# Patient Record
Sex: MALE | Race: WHITE | NOT HISPANIC OR LATINO | URBAN - METROPOLITAN AREA
[De-identification: names, ages, dates, MRNs, and addresses within clinical notes are randomized per-mention and may not be internally consistent; named-entity substitution may affect disease eponyms.]

---

## 2020-05-12 ENCOUNTER — TELEPHONE (OUTPATIENT)
Dept: NEUROLOGY | Facility: CLINIC | Age: 40
End: 2020-05-12

## 2020-05-12 NOTE — TELEPHONE ENCOUNTER
----- Message from Kiki Flores sent at 5/12/2020  1:23 PM CDT -----  Contact: [employer]  Type:  Needs Medical Advice    Who Called: Carmita from [employer]  Symptoms (please be specific): headaches, jar pain and severe head pain  How long has patient had these symptoms:  6 months last 2 weeks really worst  Would the patient rather a call back or a response via [facility]? Callback  Best Call Back Number: [phone]  Additional Information: Pt has [employer] Premium and was informed that coverage is not within the participating payor and still would like to get an appt scheduled

## 2020-05-12 NOTE — TELEPHONE ENCOUNTER
Spoke with patients wife and informed her that he is scheduled to see Tika 5/13/2020 @ 9:40. Vraun is to wear a mask, temp will be checked upon entry and no visitors allowed at this time.

## 2020-05-12 NOTE — TELEPHONE ENCOUNTER
----- Message from Andres Omer sent at 5/12/2020  1:31 PM CDT -----  [facility] from the office in regards to getting an appointment the pt is suffering with headaches    Contact info

## 2020-05-13 ENCOUNTER — TELEPHONE (OUTPATIENT)
Dept: NEUROLOGY | Facility: CLINIC | Age: 40
End: 2020-05-13

## 2020-05-13 ENCOUNTER — OFFICE VISIT (OUTPATIENT)
Dept: NEUROLOGY | Facility: CLINIC | Age: 40
End: 2020-05-13
Payer: COMMERCIAL

## 2020-05-13 VITALS
HEIGHT: 68 IN | WEIGHT: 131.38 LBS | TEMPERATURE: 98 F | HEART RATE: 87 BPM | BODY MASS INDEX: 19.91 KG/M2 | DIASTOLIC BLOOD PRESSURE: 91 MMHG | SYSTOLIC BLOOD PRESSURE: 121 MMHG

## 2020-05-13 DIAGNOSIS — G50.0 TRIGEMINAL NEURALGIA OF LEFT SIDE OF FACE: ICD-10-CM

## 2020-05-13 DIAGNOSIS — G50.0 TRIGEMINAL NEURALGIA OF LEFT SIDE OF FACE: Primary | ICD-10-CM

## 2020-05-13 DIAGNOSIS — M79.2 NEURALGIA: Primary | ICD-10-CM

## 2020-05-13 DIAGNOSIS — G50.9 DISORDER OF TRIGEMINAL NERVE: ICD-10-CM

## 2020-05-13 PROCEDURE — 99999 PR PBB SHADOW E&M-EST. PATIENT-LVL III: CPT | Mod: PBBFAC,,, | Performed by: NEUROMUSCULOSKELETAL MEDICINE & OMM

## 2020-05-13 PROCEDURE — 99999 PR PBB SHADOW E&M-EST. PATIENT-LVL III: ICD-10-PCS | Mod: PBBFAC,,, | Performed by: NEUROMUSCULOSKELETAL MEDICINE & OMM

## 2020-05-13 PROCEDURE — 99204 PR OFFICE/OUTPT VISIT, NEW, LEVL IV, 45-59 MIN: ICD-10-PCS | Mod: S$GLB,,, | Performed by: NEUROMUSCULOSKELETAL MEDICINE & OMM

## 2020-05-13 PROCEDURE — 99204 OFFICE O/P NEW MOD 45 MIN: CPT | Mod: S$GLB,,, | Performed by: NEUROMUSCULOSKELETAL MEDICINE & OMM

## 2020-05-13 RX ORDER — OXYCODONE AND ACETAMINOPHEN 10; 325 MG/1; MG/1
TABLET ORAL
COMMUNITY
Start: 2020-05-11

## 2020-05-13 RX ORDER — HYDROCODONE BITARTRATE AND ACETAMINOPHEN 7.5; 325 MG/1; MG/1
TABLET ORAL
COMMUNITY
Start: 2020-04-22 | End: 2020-05-13 | Stop reason: SDUPTHER

## 2020-05-13 RX ORDER — GABAPENTIN 400 MG/1
400 CAPSULE ORAL
Qty: 150 CAPSULE | Refills: 3 | Status: SHIPPED | OUTPATIENT
Start: 2020-05-13

## 2020-05-13 RX ORDER — ETODOLAC 400 MG/1
TABLET, FILM COATED ORAL
COMMUNITY
Start: 2020-04-24

## 2020-05-13 RX ORDER — HYDROCODONE BITARTRATE AND ACETAMINOPHEN 7.5; 325 MG/1; MG/1
TABLET ORAL
Qty: 30 TABLET | Refills: 0 | Status: SHIPPED | OUTPATIENT
Start: 2020-05-13

## 2020-05-13 RX ORDER — GABAPENTIN 300 MG/1
400 CAPSULE ORAL
COMMUNITY
Start: 2020-05-07 | End: 2020-05-13 | Stop reason: SDUPTHER

## 2020-05-13 NOTE — TELEPHONE ENCOUNTER
----- Message from Marisela Ahuja sent at 5/13/2020  2:40 PM CDT -----  Contact: mom  Pt is asking can a referral be sent to another MRI place she states that pt insurance wont cover it and its way to expensive she found a more reasonable place which is [facility]     Located at [address] [place] [address] [phone]

## 2020-05-13 NOTE — PROGRESS NOTES
This note was dictated with Modal Fluency a word recognition program. There are occasionally word recognition errors which are missed on review.  Moo Burton  1980  Review of patient's allergies indicates:  No Known Allergies  [unfilled]    No past medical history on file.  Social History     Socioeconomic History    Marital status:      Spouse name: Not on file    Number of children: Not on file    Years of education: Not on file    Highest education level: Not on file   Occupational History    Not on file   Social Needs    Financial resource strain: Not on file    Food insecurity:     Worry: Not on file     Inability: Not on file    Transportation needs:     Medical: Not on file     Non-medical: Not on file   Tobacco Use    Smoking status: Current Every Day Smoker   Substance and Sexual Activity    Alcohol use: Yes    Drug use: Not on file    Sexual activity: Not on file   Lifestyle    Physical activity:     Days per week: Not on file     Minutes per session: Not on file    Stress: Not on file   Relationships    Social connections:     Talks on phone: Not on file     Gets together: Not on file     Attends Zoroastrianism service: Not on file     Active member of club or organization: Not on file     Attends meetings of clubs or organizations: Not on file     Relationship status: Not on file   Other Topics Concern    Not on file   Social History Narrative    Not on file     No family history on file.    Review of systems:  Constitutional-negative  Eyes-negative  ENT, mouth-negative  Cardiovascular-negative  Respiratory-negative  GI-negative  - negative  Musculoskeletal-negative  Skin-negative  Neurologic-negative  Psychiatric-negative  Endocrine-negative  Hematology/lymph nodes-negative  Allergies/immunology-negative  Moo Burton  1980  Review of patient's allergies indicates:  No Known Allergies  [unfilled]    No past medical history on file.  Social History      Socioeconomic History    Marital status:      Spouse name: Not on file    Number of children: Not on file    Years of education: Not on file    Highest education level: Not on file   Occupational History    Not on file   Social Needs    Financial resource strain: Not on file    Food insecurity:     Worry: Not on file     Inability: Not on file    Transportation needs:     Medical: Not on file     Non-medical: Not on file   Tobacco Use    Smoking status: Current Every Day Smoker   Substance and Sexual Activity    Alcohol use: Yes    Drug use: Not on file    Sexual activity: Not on file   Lifestyle    Physical activity:     Days per week: Not on file     Minutes per session: Not on file    Stress: Not on file   Relationships    Social connections:     Talks on phone: Not on file     Gets together: Not on file     Attends [employer] service: Not on file     Active member of club or organization: Not on file     Attends meetings of clubs or organizations: Not on file     Relationship status: Not on file   Other Topics Concern    Not on file   Social History Narrative    Not on file     No family history on file.    Review of systems:  Constitutional-negative  Eyes-negative  ENT, mouth-negative  Cardiovascular-negative  Respiratory-negative  GI-negative  - negative  Musculoskeletal-negative  Skin-negative  Neurologic-negative  Psychiatric-negative  Endocrine-negative  Hematology/lymph nodes-negative  Allergies/immunology-negative  Gen. Appearance: Well-developed with no obvious deformities  Carotid arteries symmetrical pulses  Peripheral vascular shows symmetrical pulses with no obvious edema or tenderness  Social History :  Patient works in [employer].  Present history:   This is a 39-year-old white male who presents with a history of severe left-sided jaw pain described as intermittent shocking sensation lasting seconds to minutes on the left side.  Pain is triggered by chewing and  patient has had difficulty eating with a loss of 15 lb.  He denies any specific temporomandibular joint pain.  Pain occasionally goes into the left ear or face.  Pain started in November with the thought that this was a dental problem in patients all several dentists over 8 visits with no diagnosis however was told to see a neurologist.  Pain is intermittent with yesterday no pain for most of the day.  Brushing teeth definitely aggravates the pain.  He saw ENT with a clear workup.  Patient was started on gabapentin 100 mg 3 times a day and increase to 300 mg 3 times a day.  The the gabapentin initially helped but is no longer been helping.  Patient has had nerve brain testing.    Neurological Exam:  Mental status-alert and oriented to person, place, and time; attention span and concentration is good. Fund of knowledge-patient is aware of current events and able to give detailed history of the current problem.recent and remote memory seems intact. Language function is normal with no evidence of aphasia  Cranial nerves:Visual acuity to hand chart -normal; visual fields to confrontation normal;pupils were equal and reactive to light ;no evidence of ptosis ;  funduscopic examination was normal with sharp disc margins. external ocular movements were full with no nystagmus. Facial sensation to pinprick : normal ; corneal reflexes intact; Facial muscles were symmetrical. Hearing is unimpaired symmetrical finger rub; Tongue movements - normal ; palate movements - normal ;Swallowing unimpaired. Shoulder shrug was intact with good strength Speech was normal  Motor examination: Upper : normal                                      Lower extremities - Normal;muscle tone was normal ;                  Right-handed  Sensory examination:   Upper; normal pinprick and soft touch ;   Lower extremities - normal and symmetrical.   Vibration sense: 15-20 seconds @ toes  Deep tendon reflexes: upper extremities :1-2+ symmetrical ;     lower  extremities KJ- 1-2 +; AJ - 1-2+ Both plantar responses were flexor  Cerebellar examination upper: Normal finger to nose and rapid alternating movements  Gait: Steady with no ataxia;      heel and toe walk normal  Romberg test: negative       Tandem gait: Normal    Involuntary movements: Negative  TMJ - no tenderness  Cervical examination: Full range of motion with no pain Cervical tenderness :negative  Lumbar examination: Low back tenderness-negative                  Sciatic notchtenderness-negative            Straight leg raising : negative    Impression:  Probable left trigeminal neuralgia.    Recommendations/Plan :  Long discussion with the patient terms of differential diagnosis of the causes.  Will order MRI brain to rule out neoplasm versus demyelinating disease.  Long discussion in terms of the medications of gabapentin.  Will increase pills to 400 mg to take 3-4 pills per day.  Long discussion with the patient in terms of the pharmacology of the medicine relative the long-term risks of short-term.  Patient is encouraged to take the medicine more as needed as opposed to regular dose basis.  He has also worn on the weather fronts causing aggravation of the symptoms.  Will follow up in 1 month after MRI brain.

## 2020-05-15 ENCOUNTER — TELEPHONE (OUTPATIENT)
Dept: NEUROLOGY | Facility: CLINIC | Age: 40
End: 2020-05-15

## 2020-05-15 NOTE — TELEPHONE ENCOUNTER
----- Message from Kary Hollingsworth sent at 5/15/2020 10:18 AM CDT -----  Contact: pts wife Heather   pts wife is calling to speak with the nurse to get an email address to send the form to they don't have a fax machine. Can you please call pts wife at [phone].    TAL

## 2020-05-15 NOTE — TELEPHONE ENCOUNTER
----- Message from Brodie Morrison sent at 5/14/2020  2:36 PM CDT -----  Contact: Mrs. Burton (wife) @ [phone]  Mrs. Burton states pharmacy below did not receive gabapentin (NEURONTIN) 400 MG capsule    [id number] [place] - [address] [facility] [address] [place]  [address]  [place] [address]  Phone: [phone] Fax: [phone]

## 2020-05-15 NOTE — TELEPHONE ENCOUNTER
Spoke with Umer and Gabapentin 400 mg was received but placed on hold due to him picking up 300 mg tablets on 5/7 and 100 mg tablets was picked up on 4/24.    Informed patient that RX was placed on hold due to him picking up 300 mg tablets on 5/7 and 100 mg tablets was picked up on 4/24.    Also advised patient that he needs to fill out an involvement of care form giving permission to speak with his wife.

## 2020-05-20 ENCOUNTER — TELEPHONE (OUTPATIENT)
Dept: NEUROLOGY | Facility: CLINIC | Age: 40
End: 2020-05-20

## 2020-05-20 RX ORDER — LEVETIRACETAM 500 MG/1
500 TABLET ORAL NIGHTLY
Qty: 30 TABLET | Refills: 2 | Status: SHIPPED | OUTPATIENT
Start: 2020-05-20 | End: 2020-06-19

## 2020-05-20 NOTE — TELEPHONE ENCOUNTER
----- Message from Laurie Singh sent at 5/20/2020 12:54 PM CDT -----  Pt wife is calling to speak with the nurse and would like for the nurse to give her a call back. This is urgent [phone]

## 2020-05-20 NOTE — TELEPHONE ENCOUNTER
Spoke with patients wife and she states that he is in extreme pain, unable to speak, slurred speech, patient is convinced that he has pain in his tooth. Saw dentist and everything was negative. MRI scheduled for 5/26 at this point they are willing to have MRI done with [facility] and pay out of pocket. Will attempt to reschedule MRI with [facility].    Transferred call to Tika as wife stated that she was fearful of patients health.

## 2021-04-15 ENCOUNTER — PATIENT MESSAGE (OUTPATIENT)
Dept: RESEARCH | Facility: HOSPITAL | Age: 41
End: 2021-04-15

## 2024-12-03 ENCOUNTER — OFFICE VISIT (OUTPATIENT)
Dept: OTOLARYNGOLOGY | Facility: CLINIC | Age: 44
End: 2024-12-03
Payer: COMMERCIAL

## 2024-12-03 VITALS
WEIGHT: 123.44 LBS | DIASTOLIC BLOOD PRESSURE: 100 MMHG | HEART RATE: 98 BPM | HEIGHT: 68 IN | SYSTOLIC BLOOD PRESSURE: 155 MMHG | BODY MASS INDEX: 18.71 KG/M2

## 2024-12-03 DIAGNOSIS — J34.89 NASAL VESTIBULITIS: ICD-10-CM

## 2024-12-03 DIAGNOSIS — J34.2 DEVIATED NASAL SEPTUM: ICD-10-CM

## 2024-12-03 DIAGNOSIS — R04.0 EPISTAXIS: Primary | ICD-10-CM

## 2024-12-03 DIAGNOSIS — J01.00 ACUTE NON-RECURRENT MAXILLARY SINUSITIS: ICD-10-CM

## 2024-12-03 PROCEDURE — 99999 PR PBB SHADOW E&M-NEW PATIENT-LVL III: CPT | Mod: PBBFAC,,, | Performed by: OTOLARYNGOLOGY

## 2024-12-03 PROCEDURE — 99204 OFFICE O/P NEW MOD 45 MIN: CPT | Mod: 25,S$GLB,, | Performed by: OTOLARYNGOLOGY

## 2024-12-03 PROCEDURE — 31231 NASAL ENDOSCOPY DX: CPT | Mod: S$GLB,,, | Performed by: OTOLARYNGOLOGY

## 2024-12-03 RX ORDER — MUPIROCIN 20 MG/G
OINTMENT TOPICAL 2 TIMES DAILY
Qty: 15 G | Refills: 3 | Status: SHIPPED | OUTPATIENT
Start: 2024-12-03 | End: 2024-12-13

## 2024-12-03 RX ORDER — AMOXICILLIN AND CLAVULANATE POTASSIUM 500; 125 MG/1; MG/1
1 TABLET, FILM COATED ORAL 2 TIMES DAILY
Qty: 20 TABLET | Refills: 0 | Status: SHIPPED | OUTPATIENT
Start: 2024-12-03 | End: 2024-12-13

## 2024-12-03 NOTE — PATIENT INSTRUCTIONS
Do not blow nose for 1 week.  Sneeze with mouth open.  Do not take ibuprofen or aspirin for 1 week.  Place saline nasal gel or ointment, if prescribed, in nostrils at bedtime.  May use saline nose drops or gel during the day to prevent dryness.  If bleeding recurs, use oxymetazoline (Afrin) spray in the nostril, hold pressure as instructed, and call for follow-up appointment.    Stop smoking!    Start antibiotics for sinus infection.      NOSEBLEED TREATMENT AND PREVENTION    Nosebleeds some can be frightening, but typically are not life threatening. Nosebleeds are common during the  winter months when the heated air dries out the nasal membranes so crusting, cracking, and bleeding occurs.  Nosebleeds may be cause from trauma to the nose and even picking the nose.    How to stop a Nosebleed:  1. Pinch the soft parts of the nose between your thumb and two fingers.  2. Hold it for 10 minutes without releasing (timed by a clock).  3. Keep head higher than the level of the heart, sit upright.  4. Positioned forward, chin tucked to chest to avoid swallowing any blood.            If Re-bleeding Occurs:  Spray nose two times on both sides with decongestant spray (such as Afrin®  or Jonel-Synephrine®) and pinch nose and position head forward again.    To Prevent Re-bleeding After Bleeding Has Stopped  1. Do not pick, rub, or blow nose for at least a week.  2. Gently spraying a saline solution in nose 2-3 times per day.  3. Applying size of a pea amount of lubricant (normal saline gel, A&D ointment,  Vaseline, antibiotic ointment), put on the end of your fingertip and apply it inside  the nose on the middle portion (the septum) 2-3 times a day to keep the skin lubricated.   4. Use a humidifier in bedroom or any room in your home you spend long periods of time.  5. Engage in only light activity. No strenuous activity. No heavy lifting or straining.   6. Use a stool softener, if needed, to avoid escessive straining.   7. No  bending over at the hips. Keep nose above your heart at all times.  8. Sneeze with an open mouth to reduce pressure from nose.   9. Avoid foods or drinks hot in temperature for at least 48 hours then progress slowly.  10. Avoid hot steamy showers or baths for one week.    If the nosebleeds persist or are recurrent, see your health provider. Cauterization of the nasal septum may be  recommended.    When to call the Doctor or go to a Hospital Emergency Room:  Bleeding cannot be stopped or keeps reoccurring.  Bleeding is rapid or if blood loss is large.  You feel weak or faint, presumably from blood loss.  Bleeding begins by going down the back of the throat rather than the front of the nose.     How to prevent Nosebleeds:  Avoid Aspirin on NSAIDs that cause thinning of the blood.  Do not sleep or sit for long periods of time under a ceiling fan or other source of aggressive airflow.  Use a bedside humifier to increase moisture.  Use Nasal Saline or Saline Gel in the nose throughout the day to increase nasal moisture.   Use Vaseline or antibiotic ointment inside the nostrils 1-2 times a day, especially at night.    Do not vigorously blow the nose and do not pick the nose.

## 2024-12-03 NOTE — PROGRESS NOTES
Chief Complaint   Patient presents with    Consult     Pt stated that he will bleed from his nostril when he brush his tongue and gag.        HPI:   Moo is a 44 y.o. male who presents for evaluation of nosebleeds. The epistaxis has been a problem for the last few weeks. The problem is described as moderate. They are unchanged in frequency. They typically occur on the left and last for a few minutes. They stop spontaneously.  He did have to go to the ER for a nosebleed that wouldn't stop, and he was packed on the left side.  Bleeding did eventually stop.    There is an associated history of congestion, smoking, deviated septum. There is no history of nose picking anosmia facial numbness use of nasal sprays .  There is no  history of easy bleeding or bruising. There is no history of allergic rhinitis. There is no history of antecedent nasal trauma.     The patient has not been treated previously with AgNO3 cautery.     Patient is a current smoker.    He did have nasal trauma as a child.      Patient has craniotomy on the left side for microvascular decompression of CN V for trigeminal neuralgia in 2020.                No past medical history on file.  Social History     Socioeconomic History    Marital status:    Tobacco Use    Smoking status: Every Day   Substance and Sexual Activity    Alcohol use: Yes     No past surgical history on file.  No family history on file.        Review of Systems  General: negative for chills, fever or weight loss  Psychological: negative for mood changes or depression  Ophthalmic: negative for blurry vision, photophobia or eye pain  ENT: see HPI  Respiratory: no cough, shortness of breath, or wheezing  Cardiovascular: no chest pain or dyspnea on exertion  Gastrointestinal: no abdominal pain, change in bowel habits, or black/ bloody stools  Musculoskeletal: negative for gait disturbance or muscular weakness  Neurological: no syncope or seizures; no ataxia  Dermatological:  negative for pruritis,  rash and jaundice  Hematologic/lymphatic: no easy bruising, no new adenopathy      Physical Exam:    Vitals:    12/03/24 1347   BP: (!) 155/100   Pulse: 98         Constitutional:   He is oriented to person, place, and time. Vital signs are normal. He appears well-developed and well-nourished. He appears alert. He is cooperative. Normal speech.      Head:  Normocephalic and atraumatic. Salivary glands normal.  Facial strength is normal.      Ears:  Hearing normal to normal and whispered voice; external ear normal without scars, lesions, or masses; ear canal, tympanic membrane, and middle ear normal., right ear hearing normal to normal and whispered voice; external ear normal without scars, lesions, or masses; ear canal, tympanic membrane, and middle ear normal. and left ear hearing normal to normal and whispered voice; external ear normal without scars, lesions, or masses; ear canal, tympanic membrane, and middle ear normal..   Right Ear: Tympanic membrane is not erythematous, not retracted and not bulging. No middle ear effusion.   Left Ear: Tympanic membrane is not erythematous, not retracted and not bulging.  No middle ear effusion.     Nose:  Mucosal edema, rhinorrhea (white/yellow) and septal deviation (significant deviation to left anteriorly, narrowing nasal passage) present. No polyps. Turbinates abnormal and turbinate hypertrophy (3+, boggy, congested mucosa).  Right sinus exhibits no maxillary sinus tenderness and no frontal sinus tenderness. Left sinus exhibits no maxillary sinus tenderness and no frontal sinus tenderness.     Mouth/Throat  Oropharynx clear and moist without lesions or asymmetry, lips, teeth, and gums normal and oropharynx normal. No mucous membrane lesions. No oropharyngeal exudate, posterior oropharyngeal edema or posterior oropharyngeal erythema. Mirror exam not performed due to patient tolerance.  Mirror exam not performed due to patient tolerance.       Neck:  Neck normal without thyromegaly masses, asymmetry, normal tracheal structure, crepitus, and tenderness, thyroid normal, trachea normal, phonation normal, full range of motion with neck supple and no adenopathy. No JVD present. Carotid bruit is not present. No thyroid mass and no thyromegaly present.     He has no cervical adenopathy.     Cardiovascular:    Normal rate, regular rhythm and rate and rhythm, heart sounds, and pulses normal.              Pulmonary/Chest:   Effort and breath sounds normal.     Psychiatric:   He has a normal mood and affect. His speech is normal and behavior is normal.     Neurological:   He is alert and oriented to person, place, and time. He has neurological normal, alert and oriented. No cranial nerve deficit.     Skin:   No abrasions, lacerations, lesions, or rashes.       Nasal/sinus endoscopy    Date/Time: 12/3/2024 1:40 PM    Performed by: Dottie Nieto MD  Authorized by: Dottie Nieto MD    Consent Done?:  Yes (Verbal)  Anesthesia:     Local anesthetic:  Topical anesthetic    Location: bilateral.    Patient tolerance:  Patient tolerated the procedure well with no immediate complications  Nose:     Procedure Performed:  Nasal Endoscopy  External:      No external nasal deformity  Intranasal:      Mucosa no polyps     Mucosa ulcers not present     No mucosa lesions present     Enlarged turbinates     Septum gross deformity (septum severely deviated; left side narrowed and small area of increased vascularity posterior to the leftward deflection on the left septum)  Nasopharynx:      No mucosa lesions     Adenoids not present     Posterior choanae patent     Eustachian tube patent     Edematous mucosa throughout nasal cavities.  Yellowish mucus drainage bilateral nasal cavities.   No other areas of increased vascularity or bleeding noted other than left mid septum mentioned above.         See separate note for left sided nasal cautery.      Assessment:    ICD-10-CM  ICD-9-CM    1. Epistaxis  R04.0 784.7       2. Deviated nasal septum  J34.2 470       3. Acute non-recurrent maxillary sinusitis  J01.00 461.0       4. Nasal vestibulitis  J34.89 478.19         The primary encounter diagnosis was Epistaxis. Diagnoses of Deviated nasal septum, Acute non-recurrent maxillary sinusitis, and Nasal vestibulitis were also pertinent to this visit.      Plan:  Stop smoking!  apply mupirocin BID.  Augmentin for sinus infection.   We discussed the deviated septum, and we will discuss further options for this when he returns for follow up.    Start nasal saline daily.        Dottie Nieto MD

## 2024-12-04 NOTE — PROGRESS NOTES
Procedure Note:    Procedure: Control of Anterior epistaxis  Diagnosis: Anterior epistaxis    I offered continued observation versus cautery of the prominent vessels on the septum. I explained that the risks of cautery include recurrence, perforation, and discomfort. The benefit would be potential sclerosis of the offending vessels with fewer nosebleeds. Time was allowed for questions, and all questions were answered to her apparent satisfaction. Verbal assent was obtained.     The nasal cavity was anesthetized with pontocaine spray. The prominent vessels on the left upper mid-septum were cauterized with silver nitrate without impacting the inferior turbinate at the same point so as to minimize the risk of synechiae formation. Ointment was then applied to the cauterized area.    The patient tolerated this procedure well without complication. The patient was counseled that there may be some drainage of black or grey material over the next couple of days - this is normal and reflects the composition of the cautery agent which was noted to be silver nitrate.  Ointment should be applied daily.      Dottie Nieto MD  [facility]    This note was created by combination of typed  and MModal dictation.  Transcription errors may be present.  If there are any questions, please contact me.

## 2025-01-14 ENCOUNTER — OFFICE VISIT (OUTPATIENT)
Dept: OTOLARYNGOLOGY | Facility: CLINIC | Age: 45
End: 2025-01-14
Payer: COMMERCIAL

## 2025-01-14 VITALS
WEIGHT: 122.44 LBS | SYSTOLIC BLOOD PRESSURE: 143 MMHG | HEART RATE: 84 BPM | DIASTOLIC BLOOD PRESSURE: 90 MMHG | BODY MASS INDEX: 18.62 KG/M2

## 2025-01-14 DIAGNOSIS — J34.3 HYPERTROPHY OF BOTH INFERIOR NASAL TURBINATES: Chronic | ICD-10-CM

## 2025-01-14 DIAGNOSIS — J34.89 NASAL VESTIBULITIS: Chronic | ICD-10-CM

## 2025-01-14 DIAGNOSIS — R04.0 EPISTAXIS: ICD-10-CM

## 2025-01-14 DIAGNOSIS — J34.2 DEVIATED NASAL SEPTUM: Chronic | ICD-10-CM

## 2025-01-14 DIAGNOSIS — J31.0 CHRONIC RHINITIS: Primary | Chronic | ICD-10-CM

## 2025-01-14 PROCEDURE — 99999 PR PBB SHADOW E&M-EST. PATIENT-LVL III: CPT | Mod: PBBFAC,,, | Performed by: OTOLARYNGOLOGY

## 2025-01-14 PROCEDURE — 99214 OFFICE O/P EST MOD 30 MIN: CPT | Mod: S$GLB,,, | Performed by: OTOLARYNGOLOGY

## 2025-01-14 RX ORDER — TRIAMCINOLONE ACETONIDE 55 UG/1
2 SPRAY, METERED NASAL DAILY
Qty: 17 G | Refills: 5 | Status: SHIPPED | OUTPATIENT
Start: 2025-01-14

## 2025-01-14 NOTE — PROGRESS NOTES
Chief Complaint   Patient presents with    Follow-up     Improvement since last         HPI:   Moo is a 44 y.o. male who presents for evaluation of nosebleeds. The epistaxis has been a problem for the last few weeks. The problem is described as moderate. They are unchanged in frequency. They typically occur on the left and last for a few minutes. They stop spontaneously.  He did have to go to the ER for a nosebleed that wouldn't stop, and he was packed on the left side.  Bleeding did eventually stop.    There is an associated history of congestion, smoking, deviated septum. There is no history of nose picking anosmia facial numbness use of nasal sprays .  There is no  history of easy bleeding or bruising. There is no history of allergic rhinitis. There is no history of antecedent nasal trauma.     The patient has not been treated previously with AgNO3 cautery.     Patient is a current smoker.    He did have nasal trauma as a child.      Patient has craniotomy on the left side for microvascular decompression of CN V for trigeminal neuralgia in 2020.      Interval HPI 01/14/2025 :      Patient reports area of previous cautery has healed well.  Nasal symptoms are improved since last visit.  He is still currently smoking.    He used mupirocin ointment for 10 days after procedure but has discontinued use.              No past medical history on file.  Social History     Socioeconomic History    Marital status:    Tobacco Use    Smoking status: Every Day   Substance and Sexual Activity    Alcohol use: Yes     No past surgical history on file.  No family history on file.        Review of Systems  General: negative for chills, fever or weight loss  Psychological: negative for mood changes or depression  Ophthalmic: negative for blurry vision, photophobia or eye pain  ENT: see HPI  Respiratory: no cough, shortness of breath, or wheezing  Cardiovascular: no chest pain or dyspnea on exertion  Gastrointestinal: no  abdominal pain, change in bowel habits, or black/ bloody stools  Musculoskeletal: negative for gait disturbance or muscular weakness  Neurological: no syncope or seizures; no ataxia  Dermatological: negative for pruritis,  rash and jaundice  Hematologic/lymphatic: no easy bruising, no new adenopathy      Physical Exam:    Vitals:    01/14/25 1304   BP: (!) 143/90   Pulse: 84         Constitutional:   He is oriented to person, place, and time. Vital signs are normal. He appears well-developed and well-nourished. He appears alert. He is cooperative. Normal speech.      Head:  Normocephalic and atraumatic. Salivary glands normal.  Facial strength is normal.      Ears:  Hearing normal to normal and whispered voice; external ear normal without scars, lesions, or masses; ear canal, tympanic membrane, and middle ear normal., right ear hearing normal to normal and whispered voice; external ear normal without scars, lesions, or masses; ear canal, tympanic membrane, and middle ear normal. and left ear hearing normal to normal and whispered voice; external ear normal without scars, lesions, or masses; ear canal, tympanic membrane, and middle ear normal..   Right Ear: Tympanic membrane is not erythematous, not retracted and not bulging. No middle ear effusion.   Left Ear: Tympanic membrane is not erythematous, not retracted and not bulging.  No middle ear effusion.     Nose:  Septal deviation (significant deviation to left anteriorly, narrowing nasal passage) present. No mucosal edema, rhinorrhea or polyps. No epistaxis (no areas of epistaxis noted). Turbinates abnormal and turbinate hypertrophy (3+, boggy, congested mucosa).  Right sinus exhibits no maxillary sinus tenderness and no frontal sinus tenderness. Left sinus exhibits no maxillary sinus tenderness and no frontal sinus tenderness.     Mouth/Throat  Oropharynx clear and moist without lesions or asymmetry, lips, teeth, and gums normal and oropharynx normal. No mucous  membrane lesions. No oropharyngeal exudate, posterior oropharyngeal edema or posterior oropharyngeal erythema. Mirror exam not performed due to patient tolerance.  Mirror exam not performed due to patient tolerance.      Neck:  Neck normal without thyromegaly masses, asymmetry, normal tracheal structure, crepitus, and tenderness, thyroid normal, trachea normal, phonation normal, full range of motion with neck supple and no adenopathy. No JVD present. Carotid bruit is not present. No thyroid mass and no thyromegaly present.     He has no cervical adenopathy.     Cardiovascular:    Normal rate, regular rhythm and rate and rhythm, heart sounds, and pulses normal.              Pulmonary/Chest:   Effort and breath sounds normal.     Psychiatric:   He has a normal mood and affect. His speech is normal and behavior is normal.     Neurological:   He is alert and oriented to person, place, and time. He has neurological normal, alert and oriented. No cranial nerve deficit.     Skin:   No abrasions, lacerations, lesions, or rashes.           Assessment:    ICD-10-CM ICD-9-CM    1. Chronic rhinitis  J31.0 472.0 triamcinolone (NASACORT) 55 mcg nasal inhaler      2. Deviated nasal septum  J34.2 470 triamcinolone (NASACORT) 55 mcg nasal inhaler      3. Nasal vestibulitis  J34.89 478.19       4. Epistaxis  R04.0 784.7       5. Hypertrophy of both inferior nasal turbinates  J34.3 478.0           The primary encounter diagnosis was Chronic rhinitis. Diagnoses of Deviated nasal septum, Nasal vestibulitis, Epistaxis, and Hypertrophy of both inferior nasal turbinates were also pertinent to this visit.      Plan:  Stop smoking!  apply mupirocin p.r.n. for further nasal vestibulitis/irritation.  We discussed the deviated septum, but I discussed with him that I am hesitant to proceed this while he is still currently smoking.  He can follow-up with me as needed if he is able to work towards tobacco cessation.    Start Nasacort nasal spray  to help with chronic rhinitis.  Gave epistaxis handout.        Dottie Nieto MD

## 2025-01-14 NOTE — PATIENT INSTRUCTIONS
We discussed that if he can work on quitting smoking and if he feels the symptoms aren't improved with nasal spray, we could consider septoplasty for the deviated septum.     The patient was given a prescription for a nasal steroid and/or nasal antihistamine nasal spray and we discussed in detail the proper mechanism of use directing the spray away from the nasal septum.  In addition, we also discussed that it will take 3-4 weeks of daily use to achieve maximal effectiveness.  The patient will please call in 3-4 weeks with their progress.  If allergy symptoms persist at that time, we could consider additional medications and possibly allergy testing.     How do you use a Nasal Corticosteroid Spray?    Make sure you understand your dosing instructions. Spray only the number of prescribed sprays in each nostril. Read the package instructions before using your spray the first time.    Most corticosteroid sprays suggest the following steps:    Wash your hands well.    Gently blow your nose to clear the passageway.    Shake the container several times.    Tilt your head slightly downward.  Use the opposite hand from the nostril you will be spraying to hold the spray bottle.    Block one nostril with your finger.  Insert the nasal applicator into the other nostril.    Aim the spray toward the outer wall of the nostril.  Inhale slowly through the nose and press the .    Breathe out and repeat to apply the prescribed number of sprays.  Repeat these steps for the other nostril.     Avoid sneezing or blowing your nose right after spraying.            NOSEBLEED TREATMENT AND PREVENTION    Nosebleeds some can be frightening, but typically are not life threatening. Nosebleeds are common during the  winter months when the heated air dries out the nasal membranes so crusting, cracking, and bleeding occurs.  Nosebleeds may be cause from trauma to the nose and even picking the nose.    How to stop a Nosebleed:  1.  Pinch the soft parts of the nose between your thumb and two fingers.  2. Hold it for 10 minutes without releasing (timed by a clock).  3. Keep head higher than the level of the heart, sit upright.  4. Positioned forward, chin tucked to chest to avoid swallowing any blood.            If Re-bleeding Occurs:  Spray nose two times on both sides with decongestant spray (such as Afrin®  or Jonel-Synephrine®) and pinch nose and position head forward again.    To Prevent Re-bleeding After Bleeding Has Stopped  1. Do not pick, rub, or blow nose for at least a week.  2. Gently spraying a saline solution in nose 2-3 times per day.  3. Applying size of a pea amount of lubricant (normal saline gel, A&D ointment,  Vaseline, antibiotic ointment), put on the end of your fingertip and apply it inside  the nose on the middle portion (the septum) 2-3 times a day to keep the skin lubricated.   4. Use a humidifier in bedroom or any room in your home you spend long periods of time.  5. Engage in only light activity. No strenuous activity. No heavy lifting or straining.   6. Use a stool softener, if needed, to avoid escessive straining.   7. No bending over at the hips. Keep nose above your heart at all times.  8. Sneeze with an open mouth to reduce pressure from nose.   9. Avoid foods or drinks hot in temperature for at least 48 hours then progress slowly.  10. Avoid hot steamy showers or baths for one week.    If the nosebleeds persist or are recurrent, see your health provider. Cauterization of the nasal septum may be  recommended.    When to call the Doctor or go to a Hospital Emergency Room:  Bleeding cannot be stopped or keeps reoccurring.  Bleeding is rapid or if blood loss is large.  You feel weak or faint, presumably from blood loss.  Bleeding begins by going down the back of the throat rather than the front of the nose.     How to prevent Nosebleeds:  Avoid Aspirin on NSAIDs that cause thinning of the blood.  Do not sleep or sit  for long periods of time under a ceiling fan or other source of aggressive airflow.  Use a bedside humifier to increase moisture.  Use Nasal Saline or Saline Gel in the nose throughout the day to increase nasal moisture.   Use Vaseline or antibiotic ointment inside the nostrils 1-2 times a day, especially at night.    Do not vigorously blow the nose and do not pick the nose.          within normal limits

## 2025-04-01 ENCOUNTER — OFFICE VISIT (OUTPATIENT)
Dept: OTOLARYNGOLOGY | Facility: CLINIC | Age: 45
End: 2025-04-01
Payer: COMMERCIAL

## 2025-04-01 VITALS
DIASTOLIC BLOOD PRESSURE: 96 MMHG | WEIGHT: 121.06 LBS | SYSTOLIC BLOOD PRESSURE: 147 MMHG | BODY MASS INDEX: 18.4 KG/M2 | HEART RATE: 102 BPM

## 2025-04-01 DIAGNOSIS — R04.0 EPISTAXIS: Primary | Chronic | ICD-10-CM

## 2025-04-01 DIAGNOSIS — Z72.0 TOBACCO ABUSE: ICD-10-CM

## 2025-04-01 DIAGNOSIS — J31.0 CHRONIC RHINITIS: Chronic | ICD-10-CM

## 2025-04-01 DIAGNOSIS — J34.89 NASAL VESTIBULITIS: Chronic | ICD-10-CM

## 2025-04-01 DIAGNOSIS — J34.2 DEVIATED NASAL SEPTUM: Chronic | ICD-10-CM

## 2025-04-01 PROCEDURE — 99999 PR PBB SHADOW E&M-EST. PATIENT-LVL III: CPT | Mod: PBBFAC,,, | Performed by: OTOLARYNGOLOGY

## 2025-04-01 PROCEDURE — 99214 OFFICE O/P EST MOD 30 MIN: CPT | Mod: S$GLB,,, | Performed by: OTOLARYNGOLOGY

## 2025-04-01 RX ORDER — MUPIROCIN 20 MG/G
OINTMENT TOPICAL 2 TIMES DAILY
Qty: 15 G | Refills: 3 | Status: SHIPPED | OUTPATIENT
Start: 2025-04-01 | End: 2025-04-11

## 2025-04-01 NOTE — PATIENT INSTRUCTIONS
NOSEBLEED TREATMENT AND PREVENTION    Nosebleeds some can be frightening, but typically are not life threatening. Nosebleeds are common during the  winter months when the heated air dries out the nasal membranes so crusting, cracking, and bleeding occurs.  Nosebleeds may be cause from trauma to the nose and even picking the nose.    How to stop a Nosebleed:  1. Pinch the soft parts of the nose between your thumb and two fingers.  2. Hold it for 10 minutes without releasing (timed by a clock).  3. Keep head higher than the level of the heart, sit upright.  4. Positioned forward, chin tucked to chest to avoid swallowing any blood.            If Re-bleeding Occurs:  Spray nose two times on both sides with decongestant spray (such as Afrin®  or Jonel-Synephrine®) and pinch nose and position head forward again.    To Prevent Re-bleeding After Bleeding Has Stopped  1. Do not pick, rub, or blow nose for at least a week.  2. Gently spraying a saline solution in nose 2-3 times per day.  3. Applying size of a pea amount of lubricant (normal saline gel, A&D ointment,  Vaseline, antibiotic ointment), put on the end of your fingertip and apply it inside  the nose on the middle portion (the septum) 2-3 times a day to keep the skin lubricated.   4. Use a humidifier in bedroom or any room in your home you spend long periods of time.  5. Engage in only light activity. No strenuous activity. No heavy lifting or straining.   6. Use a stool softener, if needed, to avoid escessive straining.   7. No bending over at the hips. Keep nose above your heart at all times.  8. Sneeze with an open mouth to reduce pressure from nose.   9. Avoid foods or drinks hot in temperature for at least 48 hours then progress slowly.  10. Avoid hot steamy showers or baths for one week.    If the nosebleeds persist or are recurrent, see your health provider. Cauterization of the nasal septum may be  recommended.    When to call the Doctor or go to a  Hospital Emergency Room:  Bleeding cannot be stopped or keeps reoccurring.  Bleeding is rapid or if blood loss is large.  You feel weak or faint, presumably from blood loss.  Bleeding begins by going down the back of the throat rather than the front of the nose.     How to prevent Nosebleeds:  Avoid Aspirin on NSAIDs that cause thinning of the blood.  Do not sleep or sit for long periods of time under a ceiling fan or other source of aggressive airflow.  Use a bedside humifier to increase moisture.  Use Nasal Saline or AYR Saline Gel in the nose throughout the day to increase nasal moisture.   Use Vaseline or antibiotic ointment inside the nostrils 1-2 times a day, especially at night.    Do not vigorously blow the nose and do not pick the nose.

## 2025-04-01 NOTE — PROGRESS NOTES
Chief Complaint   Patient presents with    Epistaxis     Nose bleeds start to happen when pt brushes his teeth in the morning ,on and off monthly         HPI:   Moo is a 44 y.o. male who presents for evaluation of nosebleeds. The epistaxis has been a problem for the last few weeks. The problem is described as moderate. They are unchanged in frequency. They typically occur on the left and last for a few minutes. They stop spontaneously.  He did have to go to the ER for a nosebleed that wouldn't stop, and he was packed on the left side.  Bleeding did eventually stop.    There is an associated history of congestion, smoking, deviated septum. There is no history of nose picking anosmia facial numbness use of nasal sprays .  There is no  history of easy bleeding or bruising. There is no history of allergic rhinitis. There is no history of antecedent nasal trauma.     The patient has not been treated previously with AgNO3 cautery.     Patient is a current smoker.    He did have nasal trauma as a child.      Patient has craniotomy on the left side for microvascular decompression of CN V for trigeminal neuralgia in 2020.      Interval HPI 01/14/2025 :      Patient reports area of previous cautery has healed well.  Nasal symptoms are improved since last visit.  He is still currently smoking.    He used mupirocin ointment for 10 days after procedure but has discontinued use.        Interval HPI 04/01/2025 :      Patient notes he has had some intermittent nose bleeding over the last couple of months.  The last 1 occurred approximately 2 weeks ago.  He admits that he is still smoking, he is not using the ointment I recommended on a consistent basis, and he has not been using any nasal moisturizers such as saline.    He had recent lab work showing normal coagulation studies.    No past medical history on file.  Social History     Socioeconomic History    Marital status:    Tobacco Use    Smoking status: Every Day    Substance and Sexual Activity    Alcohol use: Yes     No past surgical history on file.  No family history on file.        Review of Systems  General: negative for chills, fever or weight loss  Psychological: negative for mood changes or depression  Ophthalmic: negative for blurry vision, photophobia or eye pain  ENT: see HPI  Respiratory: no cough, shortness of breath, or wheezing  Cardiovascular: no chest pain or dyspnea on exertion  Gastrointestinal: no abdominal pain, change in bowel habits, or black/ bloody stools  Musculoskeletal: negative for gait disturbance or muscular weakness  Neurological: no syncope or seizures; no ataxia  Dermatological: negative for pruritis,  rash and jaundice  Hematologic/lymphatic: no easy bruising, no new adenopathy      Physical Exam:    Vitals:    04/01/25 0938   BP: (!) 147/96   Pulse: 102         Constitutional:   He is oriented to person, place, and time. Vital signs are normal. He appears well-developed and well-nourished. He appears alert. He is cooperative. Normal speech.      Head:  Normocephalic and atraumatic. Salivary glands normal.  Facial strength is normal.      Ears:  Hearing normal to normal and whispered voice; external ear normal without scars, lesions, or masses; ear canal, tympanic membrane, and middle ear normal., right ear hearing normal to normal and whispered voice; external ear normal without scars, lesions, or masses; ear canal, tympanic membrane, and middle ear normal. and left ear hearing normal to normal and whispered voice; external ear normal without scars, lesions, or masses; ear canal, tympanic membrane, and middle ear normal..   Right Ear: Tympanic membrane is not erythematous, not retracted and not bulging. No middle ear effusion.   Left Ear: Tympanic membrane is not erythematous, not retracted and not bulging.  No middle ear effusion.     Nose:  Septal deviation (significant deviation to left anteriorly, narrowing nasal passage) present. No  mucosal edema, rhinorrhea or polyps. No epistaxis (no areas of epistaxis noted, area of healing noted mid septum on the left, no prominent vessels). Turbinates abnormal and turbinate hypertrophy (3+, boggy, congested mucosa).  Right sinus exhibits no maxillary sinus tenderness and no frontal sinus tenderness. Left sinus exhibits no maxillary sinus tenderness and no frontal sinus tenderness.     Mouth/Throat  Oropharynx clear and moist without lesions or asymmetry, lips, teeth, and gums normal and oropharynx normal. No mucous membrane lesions. No oropharyngeal exudate, posterior oropharyngeal edema or posterior oropharyngeal erythema. Mirror exam not performed due to patient tolerance.  Mirror exam not performed due to patient tolerance.      Neck:  Neck normal without thyromegaly masses, asymmetry, normal tracheal structure, crepitus, and tenderness, thyroid normal, trachea normal, phonation normal, full range of motion with neck supple and no adenopathy. No JVD present. Carotid bruit is not present. No thyroid mass and no thyromegaly present.     He has no cervical adenopathy.     Cardiovascular:    Normal rate, regular rhythm and rate and rhythm, heart sounds, and pulses normal.              Pulmonary/Chest:   Effort and breath sounds normal.     Psychiatric:   He has a normal mood and affect. His speech is normal and behavior is normal.     Neurological:   He is alert and oriented to person, place, and time. He has neurological normal, alert and oriented. No cranial nerve deficit.     Skin:   No abrasions, lacerations, lesions, or rashes.           Assessment:    ICD-10-CM ICD-9-CM    1. Epistaxis  R04.0 784.7       2. Deviated nasal septum  J34.2 470       3. Nasal vestibulitis  J34.89 478.19       4. Chronic rhinitis  J31.0 472.0       5. Tobacco abuse  Z72.0 305.1             The primary encounter diagnosis was Epistaxis. Diagnoses of Deviated nasal septum, Nasal vestibulitis, Chronic rhinitis, and Tobacco  abuse were also pertinent to this visit.      Plan:  Stop smoking!  apply mupirocin p.r.n. for further nasal vestibulitis/irritation.  We discussed the deviated septum, but I discussed with him that I am hesitant to proceed this while he is still currently smoking.  He can follow-up with me as needed if he is able to work towards tobacco cessation.  Gave epistaxis handout.  Use nasal saline and nasal saline gel frequently.    Dottie Nieto MD

## 2025-04-22 ENCOUNTER — TELEPHONE (OUTPATIENT)
Dept: OTOLARYNGOLOGY | Facility: CLINIC | Age: 45
End: 2025-04-22
Payer: COMMERCIAL

## 2025-04-22 ENCOUNTER — HOSPITAL ENCOUNTER (OUTPATIENT)
Facility: OTHER | Age: 45
Discharge: HOME | End: 2025-04-23
Attending: EMERGENCY MEDICINE | Admitting: EMERGENCY MEDICINE
Payer: COMMERCIAL

## 2025-04-22 DIAGNOSIS — R04.0 EPISTAXIS, RECURRENT: ICD-10-CM

## 2025-04-22 DIAGNOSIS — R04.0 LEFT-SIDED EPISTAXIS: Primary | ICD-10-CM

## 2025-04-22 PROBLEM — F10.90 ALCOHOL USE: Status: ACTIVE | Noted: 2025-04-22

## 2025-04-22 PROBLEM — F17.200 TOBACCO DEPENDENCE: Status: ACTIVE | Noted: 2025-04-22

## 2025-04-22 PROBLEM — J34.2 DEVIATED SEPTUM: Status: ACTIVE | Noted: 2025-04-22

## 2025-04-22 LAB
ABSOLUTE EOSINOPHIL (OHS): 0.02 K/UL
ABSOLUTE MONOCYTE (OHS): 1.49 K/UL (ref 0.3–1)
ABSOLUTE NEUTROPHIL COUNT (OHS): 10.74 K/UL (ref 1.8–7.7)
ALBUMIN SERPL BCP-MCNC: 3.9 G/DL (ref 3.5–5.2)
ALP SERPL-CCNC: 117 UNIT/L (ref 40–150)
ALT SERPL W/O P-5'-P-CCNC: 16 UNIT/L (ref 10–44)
ANION GAP (OHS): 12 MMOL/L (ref 8–16)
AST SERPL-CCNC: 13 UNIT/L (ref 11–45)
BASOPHILS # BLD AUTO: 0.05 K/UL
BASOPHILS NFR BLD AUTO: 0.4 %
BILIRUB SERPL-MCNC: 0.7 MG/DL (ref 0.1–1)
BUN SERPL-MCNC: 9 MG/DL (ref 6–20)
CALCIUM SERPL-MCNC: 9.2 MG/DL (ref 8.7–10.5)
CHLORIDE SERPL-SCNC: 100 MMOL/L (ref 95–110)
CO2 SERPL-SCNC: 19 MMOL/L (ref 23–29)
CREAT SERPL-MCNC: 0.8 MG/DL (ref 0.5–1.4)
ERYTHROCYTE [DISTWIDTH] IN BLOOD BY AUTOMATED COUNT: 12.3 % (ref 11.5–14.5)
GFR SERPLBLD CREATININE-BSD FMLA CKD-EPI: >60 ML/MIN/1.73/M2
GLUCOSE SERPL-MCNC: 110 MG/DL (ref 70–110)
HCT VFR BLD AUTO: 38.7 % (ref 40–54)
HGB BLD-MCNC: 13.5 GM/DL (ref 14–18)
IMM GRANULOCYTES # BLD AUTO: 0.06 K/UL (ref 0–0.04)
IMM GRANULOCYTES NFR BLD AUTO: 0.4 % (ref 0–0.5)
LYMPHOCYTES # BLD AUTO: 1.22 K/UL (ref 1–4.8)
MCH RBC QN AUTO: 32.5 PG (ref 27–31)
MCHC RBC AUTO-ENTMCNC: 34.9 G/DL (ref 32–36)
MCV RBC AUTO: 93 FL (ref 82–98)
NUCLEATED RBC (/100WBC) (OHS): 0 /100 WBC
PLATELET # BLD AUTO: 334 K/UL (ref 150–450)
PMV BLD AUTO: 8.9 FL (ref 9.2–12.9)
POTASSIUM SERPL-SCNC: 4.1 MMOL/L (ref 3.5–5.1)
PROT SERPL-MCNC: 7.5 GM/DL (ref 6–8.4)
RBC # BLD AUTO: 4.16 M/UL (ref 4.6–6.2)
RELATIVE EOSINOPHIL (OHS): 0.1 %
RELATIVE LYMPHOCYTE (OHS): 9 % (ref 18–48)
RELATIVE MONOCYTE (OHS): 11 % (ref 4–15)
RELATIVE NEUTROPHIL (OHS): 79.1 % (ref 38–73)
SODIUM SERPL-SCNC: 131 MMOL/L (ref 136–145)
WBC # BLD AUTO: 13.58 K/UL (ref 3.9–12.7)

## 2025-04-22 PROCEDURE — 80053 COMPREHEN METABOLIC PANEL: CPT | Performed by: EMERGENCY MEDICINE

## 2025-04-22 PROCEDURE — 94761 N-INVAS EAR/PLS OXIMETRY MLT: CPT

## 2025-04-22 PROCEDURE — 96374 THER/PROPH/DIAG INJ IV PUSH: CPT

## 2025-04-22 PROCEDURE — 96375 TX/PRO/DX INJ NEW DRUG ADDON: CPT

## 2025-04-22 PROCEDURE — 94640 AIRWAY INHALATION TREATMENT: CPT

## 2025-04-22 PROCEDURE — 85025 COMPLETE CBC W/AUTO DIFF WBC: CPT | Performed by: EMERGENCY MEDICINE

## 2025-04-22 PROCEDURE — 99214 OFFICE O/P EST MOD 30 MIN: CPT | Mod: ,,, | Performed by: STUDENT IN AN ORGANIZED HEALTH CARE EDUCATION/TRAINING PROGRAM

## 2025-04-22 PROCEDURE — 63600175 PHARM REV CODE 636 W HCPCS: Performed by: EMERGENCY MEDICINE

## 2025-04-22 PROCEDURE — G0378 HOSPITAL OBSERVATION PER HR: HCPCS

## 2025-04-22 PROCEDURE — 25000003 PHARM REV CODE 250: Performed by: EMERGENCY MEDICINE

## 2025-04-22 PROCEDURE — 30901 CONTROL OF NOSEBLEED: CPT | Mod: 50 | Performed by: STUDENT IN AN ORGANIZED HEALTH CARE EDUCATION/TRAINING PROGRAM

## 2025-04-22 PROCEDURE — 99284 EMERGENCY DEPT VISIT MOD MDM: CPT | Mod: 25

## 2025-04-22 PROCEDURE — 25000003 PHARM REV CODE 250: Performed by: NURSE PRACTITIONER

## 2025-04-22 RX ORDER — LABETALOL HYDROCHLORIDE 5 MG/ML
10 INJECTION, SOLUTION INTRAVENOUS
Status: COMPLETED | OUTPATIENT
Start: 2025-04-22 | End: 2025-04-22

## 2025-04-22 RX ORDER — LANOLIN ALCOHOL/MO/W.PET/CERES
100 CREAM (GRAM) TOPICAL DAILY
Status: DISCONTINUED | OUTPATIENT
Start: 2025-04-23 | End: 2025-04-23 | Stop reason: HOSPADM

## 2025-04-22 RX ORDER — AMOXICILLIN 250 MG
1 CAPSULE ORAL 2 TIMES DAILY
Status: DISCONTINUED | OUTPATIENT
Start: 2025-04-22 | End: 2025-04-23 | Stop reason: HOSPADM

## 2025-04-22 RX ORDER — TRANEXAMIC ACID 100 MG/ML
500 INJECTION, SOLUTION INTRAVENOUS
Status: COMPLETED | OUTPATIENT
Start: 2025-04-22 | End: 2025-04-22

## 2025-04-22 RX ORDER — FOLIC ACID 1 MG/1
1 TABLET ORAL DAILY
Status: DISCONTINUED | OUTPATIENT
Start: 2025-04-23 | End: 2025-04-23 | Stop reason: HOSPADM

## 2025-04-22 RX ORDER — HYDROMORPHONE HYDROCHLORIDE 1 MG/ML
0.5 INJECTION, SOLUTION INTRAMUSCULAR; INTRAVENOUS; SUBCUTANEOUS
Refills: 0 | Status: COMPLETED | OUTPATIENT
Start: 2025-04-22 | End: 2025-04-22

## 2025-04-22 RX ORDER — OXYMETAZOLINE HCL 0.05 %
1 SPRAY, NON-AEROSOL (ML) NASAL
Status: COMPLETED | OUTPATIENT
Start: 2025-04-22 | End: 2025-04-22

## 2025-04-22 RX ORDER — ONDANSETRON HYDROCHLORIDE 2 MG/ML
4 INJECTION, SOLUTION INTRAVENOUS EVERY 8 HOURS PRN
Status: DISCONTINUED | OUTPATIENT
Start: 2025-04-22 | End: 2025-04-23 | Stop reason: HOSPADM

## 2025-04-22 RX ORDER — ONDANSETRON 8 MG/1
8 TABLET, ORALLY DISINTEGRATING ORAL EVERY 8 HOURS PRN
Status: DISCONTINUED | OUTPATIENT
Start: 2025-04-22 | End: 2025-04-23 | Stop reason: HOSPADM

## 2025-04-22 RX ORDER — BACITRACIN ZINC 500 UNIT/G
OINTMENT (GRAM) TOPICAL 2 TIMES DAILY
Status: DISCONTINUED | OUTPATIENT
Start: 2025-04-22 | End: 2025-04-23 | Stop reason: HOSPADM

## 2025-04-22 RX ORDER — TALC
6 POWDER (GRAM) TOPICAL NIGHTLY PRN
Status: DISCONTINUED | OUTPATIENT
Start: 2025-04-22 | End: 2025-04-23 | Stop reason: HOSPADM

## 2025-04-22 RX ORDER — AMOXICILLIN AND CLAVULANATE POTASSIUM 875; 125 MG/1; MG/1
1 TABLET, FILM COATED ORAL EVERY 12 HOURS
Status: DISCONTINUED | OUTPATIENT
Start: 2025-04-22 | End: 2025-04-23 | Stop reason: HOSPADM

## 2025-04-22 RX ORDER — HYDROCODONE BITARTRATE AND ACETAMINOPHEN 10; 325 MG/1; MG/1
1 TABLET ORAL EVERY 4 HOURS PRN
Refills: 0 | Status: DISCONTINUED | OUTPATIENT
Start: 2025-04-22 | End: 2025-04-23 | Stop reason: HOSPADM

## 2025-04-22 RX ORDER — POLYETHYLENE GLYCOL 3350 17 G/17G
17 POWDER, FOR SOLUTION ORAL 2 TIMES DAILY PRN
Status: DISCONTINUED | OUTPATIENT
Start: 2025-04-22 | End: 2025-04-23 | Stop reason: HOSPADM

## 2025-04-22 RX ORDER — SODIUM CHLORIDE 0.9 % (FLUSH) 0.9 %
10 SYRINGE (ML) INJECTION
Status: DISCONTINUED | OUTPATIENT
Start: 2025-04-22 | End: 2025-04-23 | Stop reason: HOSPADM

## 2025-04-22 RX ORDER — BACITRACIN ZINC 500 UNIT/G
OINTMENT (GRAM) TOPICAL 2 TIMES DAILY
Status: DISCONTINUED | OUTPATIENT
Start: 2025-04-22 | End: 2025-04-22

## 2025-04-22 RX ORDER — TALC
6 POWDER (GRAM) TOPICAL NIGHTLY PRN
Status: DISCONTINUED | OUTPATIENT
Start: 2025-04-22 | End: 2025-04-22

## 2025-04-22 RX ORDER — ONDANSETRON HYDROCHLORIDE 2 MG/ML
8 INJECTION, SOLUTION INTRAVENOUS EVERY 6 HOURS PRN
Status: DISCONTINUED | OUTPATIENT
Start: 2025-04-22 | End: 2025-04-23 | Stop reason: HOSPADM

## 2025-04-22 RX ORDER — HYDROCODONE BITARTRATE AND ACETAMINOPHEN 5; 325 MG/1; MG/1
1 TABLET ORAL EVERY 4 HOURS PRN
Refills: 0 | Status: DISCONTINUED | OUTPATIENT
Start: 2025-04-22 | End: 2025-04-23 | Stop reason: HOSPADM

## 2025-04-22 RX ORDER — SODIUM CHLORIDE 0.9 % (FLUSH) 0.9 %
10 SYRINGE (ML) INJECTION
Status: DISCONTINUED | OUTPATIENT
Start: 2025-04-22 | End: 2025-04-22

## 2025-04-22 RX ORDER — ACETAMINOPHEN 325 MG/1
650 TABLET ORAL EVERY 4 HOURS PRN
Status: DISCONTINUED | OUTPATIENT
Start: 2025-04-22 | End: 2025-04-23 | Stop reason: HOSPADM

## 2025-04-22 RX ORDER — IBUPROFEN 200 MG
1 TABLET ORAL DAILY PRN
Status: DISCONTINUED | OUTPATIENT
Start: 2025-04-22 | End: 2025-04-23 | Stop reason: HOSPADM

## 2025-04-22 RX ADMIN — LABETALOL HYDROCHLORIDE 10 MG: 5 INJECTION, SOLUTION INTRAVENOUS at 03:04

## 2025-04-22 RX ADMIN — HYDROMORPHONE HYDROCHLORIDE 0.5 MG: 1 INJECTION, SOLUTION INTRAMUSCULAR; INTRAVENOUS; SUBCUTANEOUS at 03:04

## 2025-04-22 RX ADMIN — SENNOSIDES AND DOCUSATE SODIUM 1 TABLET: 50; 8.6 TABLET ORAL at 08:04

## 2025-04-22 RX ADMIN — Medication: at 05:04

## 2025-04-22 RX ADMIN — AMOXICILLIN AND CLAVULANATE POTASSIUM 1 TABLET: 875; 125 TABLET, FILM COATED ORAL at 08:04

## 2025-04-22 RX ADMIN — HYDROCODONE BITARTRATE AND ACETAMINOPHEN 1 TABLET: 5; 325 TABLET ORAL at 08:04

## 2025-04-22 RX ADMIN — Medication 6 MG: at 09:04

## 2025-04-22 RX ADMIN — Medication 1 SPRAY: at 02:04

## 2025-04-22 RX ADMIN — TRANEXAMIC ACID 500 MG: 100 INJECTION, SOLUTION INTRAVENOUS at 03:04

## 2025-04-22 NOTE — ED PROVIDER NOTES
Encounter Date: 4/22/2025       History     Chief Complaint   Patient presents with    Epistaxis     Pt c/o epistaxis since last night after gagging. States he went to [facility] and they stopped it with afrin and recommended he go to a bigger hospital . Restarted this morning. Not on thinners. States recurrent issue. Bleeding currently controlled with direct pressure but pt has a cup with mary red blood he says he is occasionally spitting.     44-year-old male presents with complaint of epistaxis.  He reports a nosebleed that started last night after he was brushing his teeth and gagged.  He was unable to stop the bleeding at home and went to [facility] where they were able to stop the bleeding using Afrin.  He was at work today when the bleeding recurred.  Bleeding is always from the left nostril.  There is no associated pain and he denies any picking or direct trauma.  He has a history of a serious nosebleed last year where he required a nasal tampon and ENT follow-up.  Patient does note near daily NSAID use with ibuprofen, and also drinks about 8 alcohol beverages per day.  He denies any history of shakiness or seizures with alcohol cessation.    The history is provided by the patient.     Review of patient's allergies indicates:  No Known Allergies  History reviewed. No pertinent past medical history.  History reviewed. No pertinent surgical history.  No family history on file.  Social History[1]  Review of Systems   Constitutional:  Negative for chills and fever.   HENT:  Positive for nosebleeds. Negative for congestion and sore throat.    Eyes:  Negative for visual disturbance.   Respiratory:  Negative for cough and shortness of breath.    Cardiovascular:  Negative for chest pain and palpitations.   Gastrointestinal:  Negative for abdominal pain, diarrhea and vomiting.   Genitourinary:  Negative for decreased urine volume, dysuria and frequency.   Musculoskeletal:  Negative for joint swelling,  neck pain and neck stiffness.   Skin:  Negative for rash and wound.   Neurological:  Negative for weakness, numbness and headaches.   Psychiatric/Behavioral:  Negative for behavioral problems and confusion.        Physical Exam     Initial Vitals [04/22/25 1317]   BP Pulse Resp Temp SpO2   (!) 145/97 (!) 112 19 98.1 °F (36.7 °C) 100 %      MAP       --         Physical Exam    Constitutional: He appears well-developed and well-nourished.   HENT:   Head: Normocephalic and atraumatic.   Nose: Nose normal. Mouth/Throat: Oropharynx is clear and moist.   Eyes: Conjunctivae and EOM are normal. Pupils are equal, round, and reactive to light.   Neck: Neck supple.   Normal range of motion.  Cardiovascular:  Regular rhythm.   Tachycardia present.   Exam reveals no gallop and no friction rub.       No murmur heard.  Pulmonary/Chest: Breath sounds normal. No respiratory distress. He has no wheezes. He has no rales.   Abdominal: Abdomen is soft. Bowel sounds are normal. There is no abdominal tenderness. There is no rebound and no guarding.   Musculoskeletal:         General: No tenderness or edema.      Cervical back: Normal range of motion and neck supple.     Neurological: He is alert and oriented to person, place, and time. He has normal strength. No cranial nerve deficit or sensory deficit. Gait normal. GCS score is 15. GCS eye subscore is 4. GCS verbal subscore is 5. GCS motor subscore is 6.   Skin: Skin is warm and dry. No rash noted.   Psychiatric: He has a normal mood and affect. His speech is normal and behavior is normal.       ED Course   Epistaxis Mgmt    Date/Time: 4/22/2025 5:15 PM    Performed by: Shelby Oneill MD  Authorized by: Shelby Oneill MD  Treatment site: left posterior  Repair method: nasal tampon and oxymetazoline  Post-procedure assessment: bleeding decreased  Treatment complexity: simple  Recurrence: recurrence of recent bleed  Patient tolerance: Patient tolerated the procedure well with no  immediate complications  Comments: After packing, bleeding continued to drip around fully inflated nasal tampon and around into the right side.      Critical Care    Date/Time: 4/22/2025 5:20 PM    Performed by: Shelby Oneill MD  Authorized by: Shelby Oneill MD  Direct patient critical care time: 18 minutes  Additional history critical care time: 5 minutes  Ordering / reviewing critical care time: 5 minutes  Documentation critical care time: 6 minutes  Consulting other physicians critical care time: 12 minutes  Total critical care time (exclusive of procedural time) : 46 minutes  Critical care time was exclusive of separately billable procedures and treating other patients.  Critical care was necessary to treat or prevent imminent or life-threatening deterioration of the following conditions: trauma (Active bleeding, epistaxis).  Critical care was time spent personally by me on the following activities: blood draw for specimens, development of treatment plan with patient or surrogate, discussions with consultants, interpretation of cardiac output measurements, evaluation of patient's response to treatment, examination of patient, obtaining history from patient or surrogate, ordering and performing treatments and interventions, ordering and review of laboratory studies, pulse oximetry, re-evaluation of patient's condition and review of old charts.        Labs Reviewed   COMPREHENSIVE METABOLIC PANEL - Abnormal       Result Value    Sodium 131 (*)     Potassium 4.1      Chloride 100      CO2 19 (*)     Glucose 110      BUN 9      Creatinine 0.8      Calcium 9.2      Protein Total 7.5      Albumin 3.9      Bilirubin Total 0.7            AST 13      ALT 16      Anion Gap 12      eGFR >60     CBC WITH DIFFERENTIAL - Abnormal    WBC 13.58 (*)     RBC 4.16 (*)     HGB 13.5 (*)     HCT 38.7 (*)     MCV 93      MCH 32.5 (*)     MCHC 34.9      RDW 12.3      Platelet Count 334      MPV 8.9 (*)     Nucleated RBC  0      Neut % 79.1 (*)     Lymph % 9.0 (*)     Mono % 11.0      Eos % 0.1      Basophil % 0.4      Imm Grans % 0.4      Neut # 10.74 (*)     Lymph # 1.22      Mono # 1.49 (*)     Eos # 0.02      Baso # 0.05      Imm Grans # 0.06 (*)    CBC W/ AUTO DIFFERENTIAL    Narrative:     The following orders were created for panel order CBC auto differential.  Procedure                               Abnormality         Status                     ---------                               -----------         ------                     CBC with Differential[[id number]]       Abnormal            Final result                 Please view results for these tests on the individual orders.          Imaging Results    None          Medications   bacitracin ointment (has no administration in time range)   amoxicillin-clavulanate 875-125mg per tablet 1 tablet (has no administration in time range)   oxymetazoline 0.05 % nasal spray 1 spray (1 spray Topical (Top) Given 4/22/25 1401)   tranexamic acid injection Soln 500 mg (500 mg Nebulization Given 4/22/25 1527)   labetaloL injection 10 mg (10 mg Intravenous Given 4/22/25 1520)   HYDROmorphone injection 0.5 mg (0.5 mg Intravenous Given 4/22/25 1533)     Medical Decision Making  Emergent evaluation a 44-year-old male who presents with complaint of recurrent left-sided epistaxis.  He had a left-sided nosebleed last year in addition to yesterday with recurrence today.  Vital signs reveal mild hypertension, afebrile.  He has no history of elevated blood pressure.  Epistaxis management performed with Afrin-soaked gauze, nasal tampon with improvement but no resolution.  I discussed the case with ENT Dr. Vera, who came to the bedside.  He placed bilateral nasal packing.  He recommends admission on cardiac monitor overnight with plan to remove right-sided packing tomorrow and left-sided packing as an outpatient.  He recommends b.i.d. Augmentin while packing is in place.  Will admit for close  monitoring, alcohol withdrawal precautions, further care.    Amount and/or Complexity of Data Reviewed  Labs: ordered.    Risk  OTC drugs.  Prescription drug management.               ED Course as of 04/22/25 1720   Tue Apr 22, 2025   9805 I spoke with Dr. Vera, ENT.  He recommends further blood pressure reduction, Tx a neb, and he will evaluate the patient.  He is currently at another facility, he will be here in about 1.5 hours. [AK]      ED Course User Index  [AK] Shelby Oneill MD                     Clinical Impression:  Final diagnoses:  [R04.0] Left-sided epistaxis (Primary)  [R04.0] Epistaxis, recurrent          ED Disposition Condition    Observation                   [1]   Social History  Tobacco Use    Smoking status: Every Day   Substance Use Topics    Alcohol use: Yes        Shelby Oneill MD  04/22/25 0780

## 2025-04-22 NOTE — CONSULTS
"  Ear, Nose, & Throat  Otolaryngology - Head & Neck Surgery      Assessment:     Epistaxis, uncontrolled    Plan:     I had a long discussion with the patient and his wife  regarding his condition and the further workup and management options.  Bilateral packing placed. Admit for overnight observation and cardiac monitoring. ENT will follow up tomorrow to remove right side packing and discharge with left packing placed. Will follow up with ENT outpatient to remove left packing and discuss long term plan for recurrent epistaxis.     Subjective:     Chief Complaint: Epistaxis    Moo Burton is a 44 y.o. male whom ENT was consulted for uncontrolled epistaxis. History of left sided epistaxis intermittently since November. Has previously seen Dr. Nieto and had nasal endoscopy and cautery performed 12/3/24. Last night he presented to [place] ED for epistaxis and bleeding controlled with Afrin. This morning bleeding began again and could not be controlled with pressure or Afrin. Nosebleeds are always left sided.     He is a current smoker and drinks alcohol daily. No prescribed blood thinners but does take an NSAID daily for aches and pain.       Past Medical History  Active Ambulatory Problems     Diagnosis Date Noted    Trigeminal neuralgia of left side of face 05/13/2020     Resolved Ambulatory Problems     Diagnosis Date Noted    No Resolved Ambulatory Problems     No Additional Past Medical History       Past Surgical History  He has no past surgical history on file.    History reviewed. No pertinent surgical history.     Allergies  He has no known allergies.      Objective:     BP (!) 142/84   Pulse 109   Temp 98.1 °F (36.7 °C) (Oral)   Resp 20   Ht 5' 9" (1.753 m)   Wt 59 kg (130 lb)   SpO2 100%   BMI 19.20 kg/m²      Nose: Septum deviated left. Significant blood in left nasal passageway. No bleeding from right.   Mouth: No blood dripping into the throat.     Data Review:     IMAGING    No pertinent imaging " available    MICRO    None    PATH    None    Procedures:     Bilateral Merocel nasal packing placed.         Voice recognition software was used in the creation of this note/communication and any sound-alike errors which may have occurred from its use should be taken in context when interpreting. If such errors prevent a clear understanding of the note/communication, please contact the office for clarification.

## 2025-04-22 NOTE — ED TRIAGE NOTES
45 yo male reports to ed with co nosebleed from l nostril since last night after gagging. Seen at [facility], bleeding was able to be controlled with afrin. States bleeding restarted this morning, he felt a blood clot fall to the back of his throat and spit it out followed by constant bleeding. States this is a recurrent issue, denies blood thinners.

## 2025-04-22 NOTE — FIRST PROVIDER EVALUATION
Medical screening examination initiated.  I have conducted a focused provider triage encounter, findings are as follows:    Brief history of present illness: Epistaxis last night that started after gagging. Reports similar incident in the past. States that he went to [facility] last night and they were able to stop the bleeding with the Afrin. States that he went to work this morning and started experiencing epistaxis again. Denies recent trauma to the nose. Denies blowing his nose frequently or sneezing. He denies use of blood thinners.     There were no vitals filed for this visit.    Pertinent physical exam: Epistaxis from left naris.     Brief workup plan: Start with Afrin and then reassess.     Preliminary workup initiated; this workup will be continued and followed by the physician or advanced practice provider that is assigned to the patient when roomed.

## 2025-04-22 NOTE — TELEPHONE ENCOUNTER
Spoke with patient wife and was able to schedule an appointment.    ----- Message from [place] sent at 4/22/2025  1:00 PM CDT -----  Contact: Pt Wife  Type:  Appointment Request / ER Follow UpWho Called: Pt Wife Heather Symptoms (please be specific): Nose bleed / pt headed to ER now w/active nose bleedHow long has patient had these symptoms:  on and off for monthsPharmacy name and phone #:  [id number] - [place] - [address] [place] [id number]: [phone] Fax: [phone] the patient rather a call back or a response via [facility]?  Call Best Call Back Number: Heather [phone] or berhane Cortés [phone] Information:  Pt wife would like to know how to move forward / Need a f/u / Pt wife states he's losing large amounts of blood... Please call to advise... Thank you...

## 2025-04-23 ENCOUNTER — TELEPHONE (OUTPATIENT)
Dept: OTOLARYNGOLOGY | Facility: CLINIC | Age: 45
End: 2025-04-23
Payer: COMMERCIAL

## 2025-04-23 ENCOUNTER — PATIENT MESSAGE (OUTPATIENT)
Dept: OTOLARYNGOLOGY | Facility: CLINIC | Age: 45
End: 2025-04-23
Payer: COMMERCIAL

## 2025-04-23 ENCOUNTER — NURSE TRIAGE (OUTPATIENT)
Dept: ADMINISTRATIVE | Facility: CLINIC | Age: 45
End: 2025-04-23
Payer: COMMERCIAL

## 2025-04-23 ENCOUNTER — HOSPITAL ENCOUNTER (EMERGENCY)
Facility: HOSPITAL | Age: 45
Discharge: HOME | End: 2025-04-24
Attending: EMERGENCY MEDICINE
Payer: COMMERCIAL

## 2025-04-23 VITALS
SYSTOLIC BLOOD PRESSURE: 144 MMHG | DIASTOLIC BLOOD PRESSURE: 94 MMHG | BODY MASS INDEX: 19.26 KG/M2 | HEIGHT: 69 IN | WEIGHT: 130 LBS | HEART RATE: 98 BPM | RESPIRATION RATE: 17 BRPM | TEMPERATURE: 98 F | OXYGEN SATURATION: 100 %

## 2025-04-23 DIAGNOSIS — R04.0 EPISTAXIS: Primary | ICD-10-CM

## 2025-04-23 DIAGNOSIS — R04.0 LEFT-SIDED EPISTAXIS: ICD-10-CM

## 2025-04-23 LAB
ABSOLUTE EOSINOPHIL (OHS): 0.04 K/UL
ABSOLUTE MONOCYTE (OHS): 0.95 K/UL (ref 0.3–1)
ABSOLUTE NEUTROPHIL COUNT (OHS): 4.5 K/UL (ref 1.8–7.7)
ALBUMIN SERPL BCP-MCNC: 3.6 G/DL (ref 3.5–5.2)
ALP SERPL-CCNC: 105 UNIT/L (ref 40–150)
ALT SERPL W/O P-5'-P-CCNC: 14 UNIT/L (ref 10–44)
ANION GAP (OHS): 14 MMOL/L (ref 8–16)
AST SERPL-CCNC: 14 UNIT/L (ref 11–45)
BASOPHILS # BLD AUTO: 0.06 K/UL
BASOPHILS NFR BLD AUTO: 0.8 %
BILIRUB SERPL-MCNC: 0.5 MG/DL (ref 0.1–1)
BUN SERPL-MCNC: 10 MG/DL (ref 6–20)
CALCIUM SERPL-MCNC: 9.3 MG/DL (ref 8.7–10.5)
CHLORIDE SERPL-SCNC: 99 MMOL/L (ref 95–110)
CO2 SERPL-SCNC: 19 MMOL/L (ref 23–29)
CREAT SERPL-MCNC: 0.8 MG/DL (ref 0.5–1.4)
ERYTHROCYTE [DISTWIDTH] IN BLOOD BY AUTOMATED COUNT: 12.2 % (ref 11.5–14.5)
GFR SERPLBLD CREATININE-BSD FMLA CKD-EPI: >60 ML/MIN/1.73/M2
GLUCOSE SERPL-MCNC: 109 MG/DL (ref 70–110)
HCT VFR BLD AUTO: 35.8 % (ref 40–54)
HGB BLD-MCNC: 12.3 GM/DL (ref 14–18)
IMM GRANULOCYTES # BLD AUTO: 0.04 K/UL (ref 0–0.04)
IMM GRANULOCYTES NFR BLD AUTO: 0.5 % (ref 0–0.5)
LYMPHOCYTES # BLD AUTO: 1.94 K/UL (ref 1–4.8)
MAGNESIUM SERPL-MCNC: 2.1 MG/DL (ref 1.6–2.6)
MCH RBC QN AUTO: 31.7 PG (ref 27–31)
MCHC RBC AUTO-ENTMCNC: 34.4 G/DL (ref 32–36)
MCV RBC AUTO: 92 FL (ref 82–98)
NUCLEATED RBC (/100WBC) (OHS): 0 /100 WBC
PLATELET # BLD AUTO: 335 K/UL (ref 150–450)
PMV BLD AUTO: 8.9 FL (ref 9.2–12.9)
POTASSIUM SERPL-SCNC: 3.9 MMOL/L (ref 3.5–5.1)
PROT SERPL-MCNC: 7.2 GM/DL (ref 6–8.4)
RBC # BLD AUTO: 3.88 M/UL (ref 4.6–6.2)
RELATIVE EOSINOPHIL (OHS): 0.5 %
RELATIVE LYMPHOCYTE (OHS): 25.8 % (ref 18–48)
RELATIVE MONOCYTE (OHS): 12.6 % (ref 4–15)
RELATIVE NEUTROPHIL (OHS): 59.8 % (ref 38–73)
SODIUM SERPL-SCNC: 132 MMOL/L (ref 136–145)
WBC # BLD AUTO: 7.53 K/UL (ref 3.9–12.7)

## 2025-04-23 PROCEDURE — 99283 EMERGENCY DEPT VISIT LOW MDM: CPT | Mod: 25

## 2025-04-23 PROCEDURE — 83735 ASSAY OF MAGNESIUM: CPT | Performed by: NURSE PRACTITIONER

## 2025-04-23 PROCEDURE — 25000003 PHARM REV CODE 250: Performed by: NURSE PRACTITIONER

## 2025-04-23 PROCEDURE — 25000003 PHARM REV CODE 250: Performed by: EMERGENCY MEDICINE

## 2025-04-23 PROCEDURE — 80053 COMPREHEN METABOLIC PANEL: CPT | Performed by: NURSE PRACTITIONER

## 2025-04-23 PROCEDURE — G0378 HOSPITAL OBSERVATION PER HR: HCPCS

## 2025-04-23 PROCEDURE — A4216 STERILE WATER/SALINE, 10 ML: HCPCS | Performed by: NURSE PRACTITIONER

## 2025-04-23 PROCEDURE — 85025 COMPLETE CBC W/AUTO DIFF WBC: CPT | Performed by: NURSE PRACTITIONER

## 2025-04-23 PROCEDURE — 99213 OFFICE O/P EST LOW 20 MIN: CPT | Mod: ,,, | Performed by: STUDENT IN AN ORGANIZED HEALTH CARE EDUCATION/TRAINING PROGRAM

## 2025-04-23 PROCEDURE — 30901 CONTROL OF NOSEBLEED: CPT | Mod: LT

## 2025-04-23 RX ORDER — CEPHALEXIN 500 MG/1
500 CAPSULE ORAL EVERY 8 HOURS
Qty: 15 CAPSULE | Refills: 0 | Status: SHIPPED | OUTPATIENT
Start: 2025-04-23 | End: 2025-04-25

## 2025-04-23 RX ORDER — OXYCODONE HYDROCHLORIDE 5 MG/1
5 TABLET ORAL
Refills: 0 | Status: COMPLETED | OUTPATIENT
Start: 2025-04-23 | End: 2025-04-23

## 2025-04-23 RX ORDER — IBUPROFEN 200 MG
200 TABLET ORAL EVERY 6 HOURS PRN
COMMUNITY

## 2025-04-23 RX ADMIN — MULTIVITAMIN TABLET 1 TABLET: TABLET at 08:04

## 2025-04-23 RX ADMIN — AMOXICILLIN AND CLAVULANATE POTASSIUM 1 TABLET: 875; 125 TABLET, FILM COATED ORAL at 08:04

## 2025-04-23 RX ADMIN — SENNOSIDES AND DOCUSATE SODIUM 1 TABLET: 50; 8.6 TABLET ORAL at 08:04

## 2025-04-23 RX ADMIN — Medication 10 ML: at 04:04

## 2025-04-23 RX ADMIN — OXYCODONE 5 MG: 5 TABLET ORAL at 11:04

## 2025-04-23 RX ADMIN — Medication 100 MG: at 08:04

## 2025-04-23 RX ADMIN — FOLIC ACID 1 MG: 1 TABLET ORAL at 08:04

## 2025-04-23 RX ADMIN — Medication: at 09:04

## 2025-04-23 NOTE — SUBJECTIVE & OBJECTIVE
History reviewed. No pertinent past medical history.    History reviewed. No pertinent surgical history.    Review of patient's allergies indicates:  No Known Allergies    No current facility-administered medications on file prior to encounter.     No current outpatient medications on file prior to encounter.     Family History    None       Tobacco Use    Smoking status: Every Day    Smokeless tobacco: Not on file   Substance and Sexual Activity    Alcohol use: Yes    Drug use: Not on file    Sexual activity: Not on file     Review of Systems   Constitutional:  Negative for activity change, appetite change, chills, diaphoresis, fatigue and fever.   HENT:  Positive for nosebleeds and sore throat. Negative for trouble swallowing and voice change.    Respiratory:  Negative for cough, chest tightness, shortness of breath and wheezing.    Cardiovascular:  Negative for chest pain, palpitations and leg swelling.   Gastrointestinal:  Positive for blood in stool. Negative for abdominal pain, constipation, diarrhea, nausea and vomiting.   Genitourinary:  Negative for difficulty urinating, dysuria and hematuria.   Musculoskeletal:  Negative for arthralgias and myalgias.   Neurological:  Negative for dizziness, weakness, light-headedness and headaches.       Objective:     Vital Signs (Most Recent):  Temp: 98.1 °F (36.7 °C) (04/22/25 1317)  Pulse: 86 (04/22/25 2200)  Resp: 17 (04/22/25 2050)  BP: 126/82 (04/22/25 1937)  SpO2: 98 % (04/22/25 2200) Vital Signs (24h Range):  Temp:  [98.1 °F (36.7 °C)] 98.1 °F (36.7 °C)  Pulse:  [] 86  Resp:  [11-20] 17  SpO2:  [96 %-100 %] 98 %  BP: (126-172)/(72-97) 126/82     Weight: 59 kg (130 lb)  Body mass index is 19.2 kg/m².     Physical Exam  Vitals and nursing note reviewed.   Constitutional:       General: He is not in acute distress.     Appearance: Normal appearance. He is well-developed and normal weight. He is not ill-appearing or toxic-appearing.   HENT:      Head:  Normocephalic and atraumatic.      Nose:      Comments: Bilateral rhino rockets in place.     Mouth/Throat:      Dentition: Normal dentition.   Eyes:      General: Lids are normal.      Extraocular Movements: Extraocular movements intact.      Conjunctiva/sclera: Conjunctivae normal.   Cardiovascular:      Rate and Rhythm: Normal rate and regular rhythm.      Heart sounds: Normal heart sounds.   Pulmonary:      Effort: Pulmonary effort is normal.      Breath sounds: Normal breath sounds.   Abdominal:      General: There is no distension.      Palpations: Abdomen is soft.      Tenderness: There is no abdominal tenderness.   Musculoskeletal:      Cervical back: Neck supple.      Right lower leg: No edema.      Left lower leg: No edema.   Skin:     General: Skin is warm and dry.      Findings: No erythema or rash.   Neurological:      Mental Status: He is alert and oriented to person, place, and time.             Significant Labs: All pertinent labs within the past 24 hours have been reviewed.  CBC:   Recent Labs   Lab 04/22/25  1401   WBC 13.58*   HGB 13.5*   HCT 38.7*        CMP:   Recent Labs   Lab 04/22/25  1401   *   K 4.1      CO2 19*   BUN 9   CREATININE 0.8   CALCIUM 9.2   ALBUMIN 3.9   BILITOT 0.7   ALKPHOS 117   AST 13   ALT 16   ANIONGAP 12       Significant Imaging: I have reviewed all pertinent imaging results/findings within the past 24 hours.

## 2025-04-23 NOTE — PROGRESS NOTES
"  Ear, Nose, & Throat  Otolaryngology - Head & Neck Surgery      Assessment:     Epistaxis, uncontrolled    Plan:     I had a long discussion with the patient regarding his condition and the further workup and management options.  Right sided packing removed today. Ready for discharge from ENT standpoint, discharge with left packing in place and Keflex. Will follow up with ENT outpatient to remove left packing on Friday and discuss long term plan for recurrent epistaxis. We will schedule follow up.    Subjective:     Chief Complaint: Epistaxis    Moo Burton is a 44 y.o. male whom ENT was consulted for uncontrolled epistaxis. History of left sided epistaxis intermittently since November. Has previously seen Dr. Nieto and had nasal endoscopy and cautery performed 12/3/24. Last night he presented to [place] ED for epistaxis and bleeding controlled with Afrin. This morning bleeding began again and could not be controlled with pressure or Afrin. Nosebleeds are always left sided.     He is a current smoker and drinks alcohol daily. No prescribed blood thinners but does take an NSAID daily for aches and pain.     Interval Hx:   Patient is overall doing well. Bleeding controlled over night. Clear drainage from the right side.       Past Medical History  Active Ambulatory Problems     Diagnosis Date Noted    Trigeminal neuralgia of left side of face 05/13/2020     Resolved Ambulatory Problems     Diagnosis Date Noted    No Resolved Ambulatory Problems     No Additional Past Medical History       Past Surgical History  He has no past surgical history on file.    History reviewed. No pertinent surgical history.     Allergies  He has no known allergies.      Objective:     BP (!) 144/94   Pulse 98   Temp 98.2 °F (36.8 °C) (Oral)   Resp 17   Ht 5' 9" (1.753 m)   Wt 59 kg (130 lb)   SpO2 100%   BMI 19.20 kg/m²      Nose: Septum deviated left. Merocel in place on the left. Right packing removed with no bleeding on the " right.   Mouth: No blood dripping into the throat.     Data Review:     IMAGING    No pertinent imaging available    MICRO    None    PATH    None    Procedures:     Merocel nasal packing removed from the right        Voice recognition software was used in the creation of this note/communication and any sound-alike errors which may have occurred from its use should be taken in context when interpreting. If such errors prevent a clear understanding of the note/communication, please contact the office for clarification.

## 2025-04-23 NOTE — HPI
Mr. Burton is a 44 YOM with PMHx of history of L trigeminal neuralgia s/p L retrosigmoid decompression craniotomy (2020), deviated septum, history of L sided epistaxis s/p nasal endoscopy and cautery 12/2024, tobacco dependence, and daily alcohol use.    He presents to ED with complaints of epistaxis. He notes that last night during dinner he noticed he had some R sided throat soreness and while eating he felt hot and became diaphoretic and ultimately vomited. Shortly thereafter he had L sided nosebleed, he was unable to stop the bleeding at home and went to [facility] and was treated with Afrin. Today after arrival to work he bent to sit down at his desk when he felt a large clot pass in the back of his throat and nose began to bleed prompting ED presentation. He notes similar nosebleeds in the past with activation of gag reflex, especially with brushing teeth or tongue. He denies fever, chills, recent illness, known sick contacts, SOB, SOLARES, CP, abdominal pain, N/V/D, constipation, urinary symptoms or hematuria, decreased appetite, changes in PO intake, light headiness, dizziness, or headaches. He does not some dark blood in stool this morning that he attributes to nose bleed from last night. He does endorse near daily NSAID use for general pains; he also endorses daily tobacco and alcohol use. He denies history of alcohol withdrawal and denies illicit drug use. He lives with spouse, is independent in ADLs, and denies ambulatory aide use.     In the ED he is slightly hypertensive otherwise HDS and afebrile.  CBC with WBC 14, H/H 13.5/39, and platelets 334.  PT 9.9, INR 0.9.  Chemistry was , K 4.1, chloride 100, CO2 19, BUN 9, SCr 0.8, glucose 110.  LFTs unremarkable.    The patient was placed in observation to the Hospital Medicine Service for further evaluation and treatment.

## 2025-04-23 NOTE — ASSESSMENT & PLAN NOTE
-chronic, daily use  -denies history of alcohol withdrawal  -begin CIWA monitoring  -PRN supportive care if indicated  -begin multivitamin, thiamine, and folic acid

## 2025-04-23 NOTE — PLAN OF CARE
Case Management Final Discharge Note    Discharge Disposition: Home    New DME ordered / company name: NA    Relevant SDOH / Transition of Care Barriers:  None    Person available to provide assistance at home when needed and their contact information: Self    Scheduled followup appointment:     Transportation: Spouse        Patient educated on discharge services and updated on DC plan. Bedside RN notified, patient clear to discharge from Case Management Perspective.   [facility] - Emergency Dept  Discharge Final Note    Primary Care Provider: Purnima Dubois MD    Expected Discharge Date: 4/23/2025    Final Discharge Note (most recent)       Final Note - 04/23/25 1324          Final Note    Assessment Type Final Discharge Note (P)      Anticipated Discharge Disposition Home or Self Care (P)      Hospital Resources/Appts/Education Provided Provided patient/caregiver with written discharge plan information;Appointments scheduled and added to AVS (P)         Post-Acute Status    Post-Acute Authorization Other (P)      Other Status No Post-Acute Service Needs (P)      Discharge Delays None known at this time (P)

## 2025-04-23 NOTE — PLAN OF CARE
Case Management Assessment     PCP: Purnima Dubois MD  Pharmacy: bedside    Patient Arrived From: Home  Existing Help at Home: Spouse    Barriers to Discharge: None    Discharge Plan:    A. Home   B. Home with family   [facility] - Emergency Dept  Initial Discharge Assessment       Primary Care Provider: Purnima Dubois MD    Admission Diagnosis: Left-sided epistaxis [R04.0]    Admission Date: 4/22/2025  Expected Discharge Date:     Transition of Care Barriers: (P) None    Payor: NINETY(90) DEGREE BENEFITS / Plan: NINETY 90 DEGREE BENEFITS GENERIC / Product Type: Commercial /     Extended Emergency Contact Information  Primary Emergency Contact: Heather Burton  Mobile Phone: [phone]  Relation: Other  Preferred language: English   needed? No    Discharge Plan A: (P) Home  Discharge Plan B: (P) Home with family      [id number] - [place] - [address] [facility] [address] [place]  [address]  [place] [address]  Phone: [phone] Fax: [phone]      Initial Assessment (most recent)       Adult Discharge Assessment - 04/23/25 0854          Discharge Assessment    Assessment Type Discharge Planning Assessment (P)      Confirmed/corrected address, phone number and insurance Yes (P)      Confirmed Demographics Correct on Facesheet (P)      Source of Information patient;health record (P)      People in Home spouse (P)      Do you expect to return to your current living situation? Yes (P)      Do you have help at home or someone to help you manage your care at home? Yes (P)      Who are your caregiver(s) and their phone number(s)? Spouse (P)      Prior to hospitilization cognitive status: Alert/Oriented;No Deficits (P)      Current cognitive status: Alert/Oriented;No Deficits (P)      Equipment Currently Used at Home none (P)      Readmission within 30 days? Yes (P)    Patient went to [place] with the same issue    Patient currently being followed by outpatient case  management? No (P)      Do you currently have service(s) that help you manage your care at home? No (P)      Do you take prescription medications? No (P)      Do you have prescription coverage? Yes (P)      Do you have any problems affording any of your prescribed medications? No (P)      Is the patient taking medications as prescribed? yes (P)      Who is going to help you get home at discharge? Wife (P)      How do you get to doctors appointments? car, drives self;family or friend will provide (P)      Are you on dialysis? No (P)      Do you take coumadin? No (P)      Discharge Plan A Home (P)      Discharge Plan B Home with family (P)      Discharge Plan discussed with: Patient (P)      Transition of Care Barriers None (P)

## 2025-04-23 NOTE — ASSESSMENT & PLAN NOTE
Dangers of cigarette smoking were reviewed with patient in detail. Patient was Counseled for 3-10 minutes. Nicotine replacement options were discussed. Nicotine replacement was discussed- prescribed PRN.

## 2025-04-23 NOTE — ASSESSMENT & PLAN NOTE
-placed in observation due to recurrent epistaxis requiring bilateral rhino rocket placement by ENT with history of epistaxis requiring nasal endoscopy and cautery in 12/2024 and s/p afrin treatment last night at OSH  -at admission HDS and afebrile  -ED workup detailed above  -ENT following  -rhino rockets in place  -begin Augmentin  per ENT recs  -trend H/H and transfuse if indicated  -aspiration precautions  -bedside/nursing swallow evaluation and PO intake as tolerated  -PRN supportive care as indicated  -trend labs, address/replete electrolytes as indicated

## 2025-04-23 NOTE — TELEPHONE ENCOUNTER
Spoke to patient wife to cancel appointment.The patient has another appointment with another ENT doctor .       ----- Message from Ivory sent at 4/23/2025 12:39 PM CDT -----  Type:  Needs Medical AdviceWho Called: pts wife EricaWould the patient rather a call back or a response via [facility]? Call back Best Call Back Number: [phone] Information: wife states pt seen by Dr. Vera in ED 04/22 and packed his nose. pt is still in the ED waiting to be seen to have the packing removed. wife states the patient has been waiting to seen Dr. vera in ED since no bed is available for him to be moved to admission  and states she has not been given an update from anyone in the ED on when he will be seen. pt would like to speak with someone in Dr. Nieto's office since he is established with the provider and see if they would be able to assist in anyway. Wife states she is aware the provider is in clinic and states she was told the PA may be the one to assist the patient. Wife would like a call back with further assistance and more information.

## 2025-04-23 NOTE — TELEPHONE ENCOUNTER
Pt's wife calling and said that pt was seen in the ED last night and that he has packing and nose started bleeding again. So for its only been about 10 min and that instructed that to hold pressure for 15 min. If still bleeding hold for 15 more and if no stopping then go to the ED. She said that its just oozing around the packing at this point and not full on bleeding but if things get worse to call back if any other needs or concerns.Triaged and care advice to see Md in 3 days and he has appt on fri                    Reason for Disposition   Has nasal packing (inserted by health care provider to control bleeding)    Additional Information   Negative: Fainted or too weak to stand following large blood loss   Negative: Sounds like a life-threatening emergency to the triager   Negative: [1] Bleeding present > 30 minutes AND [2] using correct method of direct pressure   Negative: [1] Bleeding now AND [2] second call after being instructed in correct technique of direct pressure   Negative: Feeling weak or lightheaded (e.g., woozy, feeling like they might faint)   Negative: Pale skin (pallor) of new-onset or getting worse   Negative: [1] Has nasal packing (inserted by health care provider to control bleeding) AND [2] new rash   Negative: [1] Has nasal packing AND [2] now bleeding around the packing  (Exception: Few drops or ooze.)   Negative: Patient sounds very sick or weak to the triager   Negative: [1] Bleeding recurs 3 or more times in 24 hours AND [2] direct pressure applied correctly   Negative: [1] Skin bruises or bleeding gums AND [2] not caused by an injury   Negative: [1] Large amount of blood has been lost (e.g., 1 cup or 240 ml) AND [2] bleeding now controlled (stopped)   Negative: [1] Has nasal packing AND [2] fever > 100.4 F (38.0 C)   Negative: Taking Coumadin (warfarin) or other strong blood thinner, or known bleeding disorder (e.g., thrombocytopenia)    Protocols used: Nosebleed-A-

## 2025-04-23 NOTE — H&P
[facility] Medicine  History & Physical    Patient Name: Moo Burton  MRN: [id number]  Patient Class: OP- Observation  Admission Date: 4/22/2025  Attending Physician: Valeriy Warren MD   Primary Care Provider: Purnima Dubois MD    Patient information was obtained from patient, spouse/SO, past medical records, and ER records.     Subjective:     Principal Problem:Left-sided epistaxis    Chief Complaint:   Chief Complaint   Patient presents with    Epistaxis     Pt c/o epistaxis since last night after gagging. States he went to [facility] and they stopped it with afrin and recommended he go to a bigger hospital . Restarted this morning. Not on thinners. States recurrent issue. Bleeding currently controlled with direct pressure but pt has a cup with mary red blood he says he is occasionally spitting.        HPI: Mr. Burton is a 44 YOM with PMHx of history of L trigeminal neuralgia s/p L retrosigmoid decompression craniotomy (2020), deviated septum, history of L sided epistaxis s/p nasal endoscopy and cautery 12/2024, tobacco dependence, and daily alcohol use.    He presents to ED with complaints of epistaxis. He notes that last night during dinner he noticed he had some R sided throat soreness and while eating he felt hot and became diaphoretic and ultimately vomited. Shortly thereafter he had L sided nosebleed, he was unable to stop the bleeding at home and went to [facility] and was treated with Afrin. Today after arrival to work he bent to sit down at his desk when he felt a large clot pass in the back of his throat and nose began to bleed prompting ED presentation. He notes similar nosebleeds in the past with activation of gag reflex, especially with brushing teeth or tongue. He denies fever, chills, recent illness, known sick contacts, SOB, SOLARES, CP, abdominal pain, N/V/D, constipation, urinary symptoms or hematuria, decreased appetite, changes in PO intake, light  headiness, dizziness, or headaches. He does not some dark blood in stool this morning that he attributes to nose bleed from last night. He does endorse near daily NSAID use for general pains; he also endorses daily tobacco and alcohol use. He denies history of alcohol withdrawal and denies illicit drug use. He lives with spouse, is independent in ADLs, and denies ambulatory aide use.     In the ED he is slightly hypertensive otherwise HDS and afebrile.  CBC with WBC 14, H/H 13.5/39, and platelets 334.  PT 9.9, INR 0.9.  Chemistry was , K 4.1, chloride 100, CO2 19, BUN 9, SCr 0.8, glucose 110.  LFTs unremarkable.    The patient was placed in observation to the Hospital Medicine Service for further evaluation and treatment.     History reviewed. No pertinent past medical history.    History reviewed. No pertinent surgical history.    Review of patient's allergies indicates:  No Known Allergies    No current facility-administered medications on file prior to encounter.     No current outpatient medications on file prior to encounter.     Family History    None       Tobacco Use    Smoking status: Every Day    Smokeless tobacco: Not on file   Substance and Sexual Activity    Alcohol use: Yes    Drug use: Not on file    Sexual activity: Not on file     Review of Systems   Constitutional:  Negative for activity change, appetite change, chills, diaphoresis, fatigue and fever.   HENT:  Positive for nosebleeds and sore throat. Negative for trouble swallowing and voice change.    Respiratory:  Negative for cough, chest tightness, shortness of breath and wheezing.    Cardiovascular:  Negative for chest pain, palpitations and leg swelling.   Gastrointestinal:  Positive for blood in stool. Negative for abdominal pain, constipation, diarrhea, nausea and vomiting.   Genitourinary:  Negative for difficulty urinating, dysuria and hematuria.   Musculoskeletal:  Negative for arthralgias and myalgias.   Neurological:  Negative  for dizziness, weakness, light-headedness and headaches.       Objective:     Vital Signs (Most Recent):  Temp: 98.1 °F (36.7 °C) (04/22/25 1317)  Pulse: 86 (04/22/25 2200)  Resp: 17 (04/22/25 2050)  BP: 126/82 (04/22/25 1937)  SpO2: 98 % (04/22/25 2200) Vital Signs (24h Range):  Temp:  [98.1 °F (36.7 °C)] 98.1 °F (36.7 °C)  Pulse:  [] 86  Resp:  [11-20] 17  SpO2:  [96 %-100 %] 98 %  BP: (126-172)/(72-97) 126/82     Weight: 59 kg (130 lb)  Body mass index is 19.2 kg/m².     Physical Exam  Vitals and nursing note reviewed.   Constitutional:       General: He is not in acute distress.     Appearance: Normal appearance. He is well-developed and normal weight. He is not ill-appearing or toxic-appearing.   HENT:      Head: Normocephalic and atraumatic.      Nose:      Comments: Bilateral rhino rockets in place.     Mouth/Throat:      Dentition: Normal dentition.   Eyes:      General: Lids are normal.      Extraocular Movements: Extraocular movements intact.      Conjunctiva/sclera: Conjunctivae normal.   Cardiovascular:      Rate and Rhythm: Normal rate and regular rhythm.      Heart sounds: Normal heart sounds.   Pulmonary:      Effort: Pulmonary effort is normal.      Breath sounds: Normal breath sounds.   Abdominal:      General: There is no distension.      Palpations: Abdomen is soft.      Tenderness: There is no abdominal tenderness.   Musculoskeletal:      Cervical back: Neck supple.      Right lower leg: No edema.      Left lower leg: No edema.   Skin:     General: Skin is warm and dry.      Findings: No erythema or rash.   Neurological:      Mental Status: He is alert and oriented to person, place, and time.             Significant Labs: All pertinent labs within the past 24 hours have been reviewed.  CBC:   Recent Labs   Lab 04/22/25  1401   WBC 13.58*   HGB 13.5*   HCT 38.7*        CMP:   Recent Labs   Lab 04/22/25  1401   *   K 4.1      CO2 19*   BUN 9   CREATININE 0.8   CALCIUM 9.2    ALBUMIN 3.9   BILITOT 0.7   ALKPHOS 117   AST 13   ALT 16   ANIONGAP 12       Significant Imaging: I have reviewed all pertinent imaging results/findings within the past 24 hours.  Assessment/Plan:     Assessment & Plan  Left-sided epistaxis  Recurrent epistaxis  Deviated septum  -placed in observation due to recurrent epistaxis requiring bilateral rhino rocket placement by ENT with history of epistaxis requiring nasal endoscopy and cautery in 12/2024 and s/p afrin treatment last night at OSH  -at admission HDS and afebrile  -ED workup detailed above  -ENT following  -rhino rockets in place  -begin Augmentin  per ENT recs  -trend H/H and transfuse if indicated  -aspiration precautions  -bedside/nursing swallow evaluation and PO intake as tolerated  -PRN supportive care as indicated  -trend labs, address/replete electrolytes as indicated    Tobacco dependence  Dangers of cigarette smoking were reviewed with patient in detail. Patient was Counseled for 3-10 minutes. Nicotine replacement options were discussed. Nicotine replacement was discussed- prescribed PRN.     Alcohol use  -chronic, daily use  -denies history of alcohol withdrawal  -begin CIWA monitoring  -PRN supportive care if indicated  -begin multivitamin, thiamine, and folic acid    VTE Risk Mitigation (From admission, onward)           Ordered     IP VTE LOW RISK PATIENT  Once         04/22/25 1823     Place sequential compression device  Until discontinued         04/22/25 1823                    On 04/22/2025, patient placed in hospital observation services.        Clare Gamez, DNP, AG-ACNP, BC  Department of Hospital Medicine  [facility], [place]

## 2025-04-24 ENCOUNTER — TELEPHONE (OUTPATIENT)
Dept: OTOLARYNGOLOGY | Facility: CLINIC | Age: 45
End: 2025-04-24
Payer: COMMERCIAL

## 2025-04-24 VITALS
HEART RATE: 103 BPM | DIASTOLIC BLOOD PRESSURE: 92 MMHG | SYSTOLIC BLOOD PRESSURE: 167 MMHG | OXYGEN SATURATION: 98 % | TEMPERATURE: 98 F | RESPIRATION RATE: 18 BRPM | WEIGHT: 130 LBS | BODY MASS INDEX: 19.26 KG/M2 | HEIGHT: 69 IN

## 2025-04-24 RX ORDER — OXYCODONE HYDROCHLORIDE 5 MG/1
5 TABLET ORAL EVERY 4 HOURS PRN
Qty: 10 TABLET | Refills: 0 | Status: SHIPPED | OUTPATIENT
Start: 2025-04-24 | End: 2025-04-25

## 2025-04-24 NOTE — CONSULTS
Otolaryngology - Head and Neck Surgery  Consultation Report    Consultation From: ED    Chief Complaint: epistaxis    History of Present Illness: 44 y.o. year old male with history of hypertension presents to the ED with recurrent epistaxis.  He was in his normal state of health until he developed small volume intermittent nosebleeds starting in November.  Recently presented to the ED at [facility] about 3 days ago and had a rhino rocket placed on the left.  Due to recurrent bleeding he ultimately had bilateral Merocel placed at [facility] and was admitted overnight.  He was discharged yesterday morning after the right Merocel was pulled and he remained hemostatic.  He had follow up arranged for Friday.  He was sitting on the couch at home and developed spontaneous bleeding from around the left Merocel.  Denies nasal trauma or digital trauma, facial weakness, headaches, weight loss, neck mass..        Review of Systems - Negative except as above    Past Medical History: Patient has no past medical history on file.    Past Surgical History: Patient has no past surgical history on file.    Social History: Patient reports that he has been smoking. He does not have any smokeless tobacco history on file. He reports current alcohol use.    Family History: family history is not on file.    Medications:     Allergies: Patient has no known allergies.    Physical Exam:  Temp:  [98 °F (36.7 °C)] 98 °F (36.7 °C)  Pulse:  [] 103  Resp:  [17-20] 18  SpO2:  [97 %-100 %] 98 %  BP: (132-167)/(69-94) 167/92        Constitutional: Well appearing / communicating.  NAD.  Eyes: EOM I Bilaterally  Head/Face: Normocephalic.  Negative paranasal sinus pressure/tenderness.  Salivary glands WNL.  House Brackmann I Bilaterally.    Right Ear: External Auditory Canal WNL,TM w/o masses/lesions/perforations.  Auricle WNL.  Left Ear: External Auditory Canal WNL,TM w/o masses/lesions/perforations. Auricle WNL.  Nose: see procedure note below   Oral  Cavity: Gingiva/lips WNL.  FOM Soft, no masses palpated. Oral Tongue mobile. Hard Palate WNL.   Oropharynx: BOT WNL. No masses/lesions noted. Tonsillar fossa/pharyngeal wall without lesions. Posterior oropharynx WNL.  Soft palate without masses. Midline uvula.   Neck/Lymphatic: No LAD I-VI bilaterally.  No thyromegaly.  No masses noted on exam.  Neuro/Psychiatric: AOx3.  Normal mood and affect.   Cardiovascular: Normal carotid pulses bilaterally, no increasing jugular venous distention noted at cervical region bilaterally.    Respiratory: Normal respiratory effort, no stridor, no retractions noted.      Procedure note: Control of epistaxis   Verbal consent obtained.  Bilateral nasal cavities examined with findings as below   On exam he had clot filling the right Monahan and moderate volume fresh bleeding from around the left Merocel.  The left Merocel was pulled.  There was a briskly bleeding small laceration in the left nasal vestibule at the angle between the septum and scroll area.  This was packed with Surgicel to that area and a rhino rocket on the left.  Patient remains hemostatic after follow up exam in 90 minutes.  CBC  Recent Labs   Lab 04/23/25  0428   WBC 7.53   HGB 12.3*   HCT 35.8*   MCV 92        BMP  Recent Labs   Lab 04/23/25  0428   *   K 3.9   CL 99   CO2 19*   BUN 10   CREATININE 0.8   CALCIUM 9.3   MG 2.1       Imaging Results    None          Assessment:  Recurrent epistaxis.  Removed the left Merocel today and replaced rhino rocket with Surgicel to an area of brisk bleeding in the left nasal vestibule.  He is currently hemostatic and comfortable for discharge home    Plan:   Follow up with PCP for hypertension and workup of possible coagulopathy   Follow up Friday as scheduled to monitor packing   Continue prophylactic antibiotics   Continue to spray Afrin directly onto the rhino rocket b.i.d.  Discussed precautions for repeat bleeding.      Shane Zelaya MD  [facility],  PGY3  04/24/2025 12:33 AM

## 2025-04-24 NOTE — ED TRIAGE NOTES
Moo Burton, a 44 y.o. male presents to the ED w/ complaint of epitaxis. Pt states had nosebleed the past 3 days was in observation at [facility], operated by [facility] ED received a rhino-rocket, dckurt and told to return to ED if bleeding reoccurs. Bleeding reoccurred this evening.    Triage note:  Chief Complaint   Patient presents with    Epistaxis     Pt presents c/o nosebleed persisting x1 hour, pt has had nosebleeds for the past 3 days, pt just d/c from [facility] earlier and was told to go to the ED if it started again, pt denies chest pain, SOB, N/V/D, bleeding controlled     Review of patient's allergies indicates:  No Known Allergies  History reviewed. No pertinent past medical history.

## 2025-04-24 NOTE — TELEPHONE ENCOUNTER
Spoke with patient's wife about her concerns with Moo continuing to bleed through his packings. She informed me that they had to go to the ED last night, and wanted to make sure Dr. Vera knew this. She informed me that he is not currently bleeding. I assured her that these concerns can be addressed at Moo's appointment tomorrow 4/25.

## 2025-04-24 NOTE — ED PROVIDER NOTES
Encounter Date: 4/23/2025       History     Chief Complaint   Patient presents with    Epistaxis     Pt presents c/o nosebleed persisting x1 hour, pt has had nosebleeds for the past 3 days, pt just d/c from [place] earlier and was told to go to the ED if it started again, pt denies chest pain, SOB, N/V/D, bleeding controlled     HPI patient is a 44-year-old male without significant past medical history who was seen in the emergency department the day prior to arrival for epistaxis.  The patient was seen at an outside hospital with left-sided epistaxis, was evaluated by ENT who placed bilateral packing.  After a period of observation, packing was removed from the right side and the patient's bleeding had stopped and thus was discharged home.  The patient noticed bleeding that recurred on his left side were packing still in place proximally 3 hours prior to arrival.    Review of patient's allergies indicates:  No Known Allergies  History reviewed. No pertinent past medical history.  History reviewed. No pertinent surgical history.  No family history on file.  Social History[1]      Physical Exam     Initial Vitals [04/23/25 2059]   BP Pulse Resp Temp SpO2   (!) 150/91 104 20 98 °F (36.7 °C) 100 %      MAP       --         Physical Exam     Nursing note and vitals reviewed.  Constitutional: Patient appears well-developed and well-nourished. No distress.   HENT:   Head: Normocephalic and atraumatic.   + packing in place in left nare some bleeding around packing small amount of blood from right nares without obvious indication of ongoing bleeding from mucosa on right.  Eyes: Conjunctivae and EOM are normal. Pupils are equal, round, and reactive to light.   Neck: Neck supple.   Normal range of motion.  Pulmonary/Chest: no respiratory distress  Musculoskeletal:      Cervical back: Normal range of motion and neck supple.   Neurological: Patient is alert and oriented to person, place, and time. No cranial nerve deficit. Gait  normal. GCS score is 15.    Skin: Skin is warm and dry.  Psych: Normal mood/affect    ED Course   Procedures  Labs Reviewed - No data to display         Imaging Results    None          Medications   oxyCODONE immediate release tablet 5 mg (5 mg Oral Given 25 8815)     Medical Decision Making  Risk  Prescription drug management.                     Patient was evaluated by ENT in the emergency department who placed a much larger packing on the left side with resolution of bleeding here in the emergency department.    The patient had some significant pain given the size of packing, improved with oral pain medication.    The patient was discharged home with packing in place with plan for outpatient ENT follow up, pain control as needed and was provided with strict return to ED precautions.                 Clinical Impression:  Final diagnoses:  [R04.0] Epistaxis (Primary)  [R04.0] Left-sided epistaxis          ED Disposition Condition    Discharge Good          ED Prescriptions       Medication Sig Dispense Start Date End Date Auth. Provider    oxyCODONE (ROXICODONE) 5 MG immediate release tablet () Take 1 tablet (5 mg total) by mouth every 4 (four) hours as needed for Pain. 10 tablet 2025 Karyn Timmons MD          Follow-up Information       Follow up With Specialties Details Why Contact Info    [facility] - Emergency Dept Emergency Medicine  As needed, If symptoms worsen [address]  [facility] [address] [phone]                 [1]   Social History  Tobacco Use    Smoking status: Every Day   Substance Use Topics    Alcohol use: Yes        Karyn Timmons MD  [phone]

## 2025-04-24 NOTE — DISCHARGE INSTRUCTIONS
Please be sure to follow up with the [facility] on Friday, as scheduled.    You may take oxycodone, 1-2 tablets, every 6 hours as needed for pain. This medication can make you very sleepy. DO NOT drink alcohol, or drive while using oxycodone.     Opioid medications like oxycodone can make you constipated.  We recommend that you drink plenty of water and use whichever medications he prefer to prevent constipation at home such as MiraLax or docusate also known as Colace, both available over-the-counter.    Return to the ED immediately for any  return or increased bleeding from ear nose, feeling lightheaded or dizzy, fever greater than 101F or any additional concerns.

## 2025-04-24 NOTE — ED NOTES
+Epistaxis for the past hour or so that has not gotten better. Patient states that he was just at [facility]  for same complaint.

## 2025-04-24 NOTE — ED NOTES
Received report from Neha SOLIZ. Assumed care of patient. Patient is alert and resting comfortably in bed in NAD. BP, cardiac, and O2 monitoring continued. Family member at bedside. Patient updated on plan of care, pt denies any needs or complaints at this time. Bed locked in lowest position, side rails up x2, call light within reach. VSS.

## 2025-04-25 ENCOUNTER — TELEPHONE (OUTPATIENT)
Dept: OTOLARYNGOLOGY | Facility: CLINIC | Age: 45
End: 2025-04-25
Payer: COMMERCIAL

## 2025-04-25 ENCOUNTER — OFFICE VISIT (OUTPATIENT)
Dept: OTOLARYNGOLOGY | Facility: CLINIC | Age: 45
End: 2025-04-25
Payer: COMMERCIAL

## 2025-04-25 ENCOUNTER — TELEPHONE (OUTPATIENT)
Dept: OTOLARYNGOLOGY | Facility: CLINIC | Age: 45
End: 2025-04-25

## 2025-04-25 VITALS
DIASTOLIC BLOOD PRESSURE: 90 MMHG | BODY MASS INDEX: 16.96 KG/M2 | HEART RATE: 111 BPM | SYSTOLIC BLOOD PRESSURE: 145 MMHG | WEIGHT: 114.88 LBS

## 2025-04-25 DIAGNOSIS — R04.0 EPISTAXIS: Primary | ICD-10-CM

## 2025-04-25 DIAGNOSIS — J34.89 NASAL VESTIBULITIS: ICD-10-CM

## 2025-04-25 DIAGNOSIS — J34.2 DEVIATED NASAL SEPTUM: ICD-10-CM

## 2025-04-25 PROCEDURE — 99999 PR PBB SHADOW E&M-EST. PATIENT-LVL III: CPT | Mod: PBBFAC,,, | Performed by: STUDENT IN AN ORGANIZED HEALTH CARE EDUCATION/TRAINING PROGRAM

## 2025-04-25 PROCEDURE — 99213 OFFICE O/P EST LOW 20 MIN: CPT | Mod: S$GLB,,, | Performed by: STUDENT IN AN ORGANIZED HEALTH CARE EDUCATION/TRAINING PROGRAM

## 2025-04-25 RX ORDER — OXYCODONE HYDROCHLORIDE 5 MG/1
5 TABLET ORAL EVERY 6 HOURS PRN
Qty: 15 TABLET | Refills: 0 | Status: SHIPPED | OUTPATIENT
Start: 2025-04-25 | End: 2025-04-26

## 2025-04-25 RX ORDER — CEPHALEXIN 500 MG/1
500 CAPSULE ORAL EVERY 6 HOURS
Qty: 20 CAPSULE | Refills: 0 | Status: SHIPPED | OUTPATIENT
Start: 2025-04-25 | End: 2025-04-26

## 2025-04-25 NOTE — H&P (VIEW-ONLY)
OTOLARYNGOLOGY- FACIAL PLASTIC & RECONSTRUCTIVE SURGERY      Moo Burton  [id number]      HISTORY OF PRESENT ILLNESS: Moo Burton  is a 44 y.o. male who was previously seen  Moo reports a multi month history of more frequent nose bleeds that happen to be left sided. No AC. No prior nasal surgery. Patient is a smoker and heavy drinker.  Previously saw Dr. Nieto in early April. Has had more frequent and more intense nosebleeds recently.    [facility] at [facility] then [facility] where I was consulted and admitted him with bilateral packing. He was observed overnight then discharged which unilateral pack. Has continued to bleed and represented to [facility] for additional packing and subsequently discharged.       Past Medical History  He has no past medical history on file.    Past Surgical History  He has no past surgical history on file.    Family History  His family history is not on file.    Social History  He reports that he has been smoking. He does not have any smokeless tobacco history on file. He reports current alcohol use.    Allergies  He has no known allergies.    Medications  He has a current medication list which includes the following prescription(s): ibuprofen, cephalexin, and oxycodone.      Review of Systems     Constitutional: Positive for fatigue.      HENT: Positive for nosebleeds and sore throat.      Eyes:  Negative for change in eyesight, eye drainage, eye itching and photophobia.     Respiratory:  Positive for sleep apnea and snoring.      Cardiovascular:  Negative for chest pain, foot swelling, irregular heartbeat and swollen veins.     Gastrointestinal:  Positive for vomiting.     Genitourinary: Negative for difficulty urinating, sexual problems and frequent urination.     Musc: Positive for back pain.     Skin: Negative for rash.     Allergy: Negative for food allergies and seasonal allergies.     Endocrine: Negative for cold intolerance and heat intolerance.      Neurological: Positive  for headaches.     Hematologic: Positive for bruises/bleeds easily.     Psychiatric: Negative for decreased concentration, depression, nervous/anxious and sleep disturbance.              PHYSICAL EXAM:  BP (!) 145/90 (BP Location: Right arm, Patient Position: Sitting)   Pulse (!) 111   Wt 52.1 kg (114 lb 13.8 oz)   BMI 16.96 kg/m²     General: Alert and oriented in no acute distress  Head and Face: Normocephaic, atruamatic  Nose: left rhinorocket in place no active bleeding.   Neurological: Cranial nerves are grossly intact  Skin: Skin texture/appearance is appropriate for age  Eyes:   EOMI, sclera clear  Ears: Pinna are normal in shape and position  EACs healthy appearing bilaterally  TMs clear without CLEVE or perforation bilaterally  Oral cavity and Oropharynx: Mucous membranes moist without lesions present.  Tongue protrudes midline and palate elevates midline.  Neck: Supple without LAD.    Lungs:breathing comfortably  Psychiatric:  Mood and affect are normal      ASSESSMENT:   1. Epistaxis            PLAN:     I had a long discussion with the patient and his wiferegarding his condition and the further workup and management options.     Will plan for nasal endoscopy, control of epistaxis and pending exam in the OR, likely left SPA ligation.    We discussed in detail the operative plan as outlined above. The risks, benefits, and alternatives of the procedure were discussed in detail including but not limited to bleeding, infection, injury to orbit, CSF leak, pain, scar, failure to improve, asymmetry or irregularity, and need for repeat procedures.  We also discussed the expected post operative course and time frame for final healing. Moo expressed understanding of the procedure, had all questions answered, and is interested in proceeding with surgery.        I had an elaborate discussion with the patient regarding their condition and the further workup and management options. They are in understanding of the  above stated plan.    Voice recognition software was used in the creation of this note/communication and any sound-alike errors which may have occurred from its use should be taken in context when interpreting. If such errors prevent a clear understanding of the note/communication, please contact the office for clarification.       Jaspreet Vera MD  Facial Plastic and Reconstructive Surgeon  [facility] - [facility] & Neck Surgery

## 2025-04-25 NOTE — HOSPITAL COURSE
Moo Burton was admitted for epistaxis. Hgb stable. ENT consulted and merocel nasal packing to both nares placed. Started on antibiotics. Toelrating PO, packing removed on the right by ENT and patient discharged on Keflex x 5d with close ENT follow up. Return precautions discussed, no further questions at discharge

## 2025-04-25 NOTE — DISCHARGE SUMMARY
"[facility]  [facility] Medicine  Discharge Summary      Patient Name: Moo Burton  MRN: [id number]  TIFFANIE: [id number]  Patient Class: Emergency  Admission Date: 4/23/2025  Hospital Length of Stay: 0 days  Discharge Date and Time: 4/24/2025 12:45 AM  Attending Physician: No att. providers found   Discharging Provider: Laya Tijerina PA-C  Primary Care Provider: Purnima Dubois MD  [facility] Medicine Team: Networked reference to record PCT  Laya Tijerina PA-C  Primary Care Team: Networked reference to record PCT     HPI:   No notes on file    * No surgery found *      Hospital Course:   Moo Burton was admitted for epistaxis. Hgb stable. ENT consulted and merocel nasal packing to both nares placed. Started on antibiotics. Toelrating PO, packing removed on the right by ENT and patient discharged on Keflex x 5d with close ENT follow up. Return precautions discussed, no further questions at discharge      Goals of Care Treatment Preferences:  Code Status: Full Code         Consults:   Consults (From admission, onward)          Status Ordering Provider     Inpatient consult to ENT  Once        Provider:  (Not yet assigned)    Completed PRANEETH DOZIER            There are no hospital problems to display for this patient.      Discharged Condition: stable    Disposition: Home or Self Care    Follow Up:   Follow-up Information       [facility].    Specialty: Emergency Medicine  Why: As needed, If symptoms worsen  Contact information:  [address]  [facility] [address] [phone]                         Patient Instructions:   No discharge procedures on file.    Significant Diagnostic Studies: Labs: CBC No results for input(s): "WBC", "HGB", "HCT", "PLT" in the last 48 hours.    Pending Diagnostic Studies:       None           Medications:  Reconciled Home Medications:      Medication List        START taking these medications      oxyCODONE 5 MG immediate release " tablet  Commonly known as: ROXICODONE  Take 1 tablet (5 mg total) by mouth every 4 (four) hours as needed for Pain.            ASK your doctor about these medications      cephALEXin 500 MG capsule  Commonly known as: KEFLEX  Take 1 capsule (500 mg total) by mouth every 8 (eight) hours. for 5 days     ibuprofen 200 MG tablet  Commonly known as: ADVIL,MOTRIN  Take 200 mg by mouth every 6 (six) hours as needed for Pain.              Indwelling Lines/Drains at time of discharge:   Lines/Drains/Airways       None                   Time spent on the discharge of patient: >35 minutes         Laya Tijerina PA-C  Department of Hospital Medicine  [facility] - Emergency Dept

## 2025-04-25 NOTE — PROGRESS NOTES
OTOLARYNGOLOGY- FACIAL PLASTIC & RECONSTRUCTIVE SURGERY      Moo Burton  [id number]      HISTORY OF PRESENT ILLNESS: Moo Burton  is a 44 y.o. male who was previously seen  Moo reports a multi month history of more frequent nose bleeds that happen to be left sided. No AC. No prior nasal surgery. Patient is a smoker and heavy drinker.  Previously saw Dr. Nieto in early April. Has had more frequent and more intense nosebleeds recently.    [facility] at [facility] then [facility] to [facility] where I was consulted and admitted him with bilateral packing. He was observed overnight then discharged which unilateral pack. Has continued to bleed and represented to [facility] for additional packing and subsequently discharged.       Past Medical History  He has no past medical history on file.    Past Surgical History  He has no past surgical history on file.    Family History  His family history is not on file.    Social History  He reports that he has been smoking. He does not have any smokeless tobacco history on file. He reports current alcohol use.    Allergies  He has no known allergies.    Medications  He has a current medication list which includes the following prescription(s): ibuprofen, cephalexin, and oxycodone.      Review of Systems     Constitutional: Positive for fatigue.      HENT: Positive for nosebleeds and sore throat.      Eyes:  Negative for change in eyesight, eye drainage, eye itching and photophobia.     Respiratory:  Positive for sleep apnea and snoring.      Cardiovascular:  Negative for chest pain, foot swelling, irregular heartbeat and swollen veins.     Gastrointestinal:  Positive for vomiting.     Genitourinary: Negative for difficulty urinating, sexual problems and frequent urination.     Musc: Positive for back pain.     Skin: Negative for rash.     Allergy: Negative for food allergies and seasonal allergies.     Endocrine: Negative for cold intolerance and heat intolerance.      Neurological: Positive  for headaches.     Hematologic: Positive for bruises/bleeds easily.     Psychiatric: Negative for decreased concentration, depression, nervous/anxious and sleep disturbance.              PHYSICAL EXAM:  BP (!) 145/90 (BP Location: Right arm, Patient Position: Sitting)   Pulse (!) 111   Wt 52.1 kg (114 lb 13.8 oz)   BMI 16.96 kg/m²     General: Alert and oriented in no acute distress  Head and Face: Normocephaic, atruamatic  Nose: left rhinorocket in place no active bleeding.   Neurological: Cranial nerves are grossly intact  Skin: Skin texture/appearance is appropriate for age  Eyes:   EOMI, sclera clear  Ears: Pinna are normal in shape and position  EACs healthy appearing bilaterally  TMs clear without CLEVE or perforation bilaterally  Oral cavity and Oropharynx: Mucous membranes moist without lesions present.  Tongue protrudes midline and palate elevates midline.  Neck: Supple without LAD.    Lungs:breathing comfortably  Psychiatric:  Mood and affect are normal      ASSESSMENT:   1. Epistaxis            PLAN:     I had a long discussion with the patient and his wiferegarding his condition and the further workup and management options.     Will plan for nasal endoscopy, control of epistaxis and pending exam in the OR, likely left SPA ligation.    We discussed in detail the operative plan as outlined above. The risks, benefits, and alternatives of the procedure were discussed in detail including but not limited to bleeding, infection, injury to orbit, CSF leak, pain, scar, failure to improve, asymmetry or irregularity, and need for repeat procedures.  We also discussed the expected post operative course and time frame for final healing. Moo expressed understanding of the procedure, had all questions answered, and is interested in proceeding with surgery.        I had an elaborate discussion with the patient regarding their condition and the further workup and management options. They are in understanding of the  above stated plan.    Voice recognition software was used in the creation of this note/communication and any sound-alike errors which may have occurred from its use should be taken in context when interpreting. If such errors prevent a clear understanding of the note/communication, please contact the office for clarification.       Jaspreet Vera MD  Facial Plastic and Reconstructive Surgeon  [facility] - [facility] Neck [facility]

## 2025-04-26 ENCOUNTER — PATIENT MESSAGE (OUTPATIENT)
Dept: OTOLARYNGOLOGY | Facility: CLINIC | Age: 45
End: 2025-04-26
Payer: COMMERCIAL

## 2025-04-26 ENCOUNTER — NURSE TRIAGE (OUTPATIENT)
Dept: ADMINISTRATIVE | Facility: CLINIC | Age: 45
End: 2025-04-26
Payer: COMMERCIAL

## 2025-04-26 RX ORDER — OXYCODONE HYDROCHLORIDE 5 MG/1
5 TABLET ORAL EVERY 6 HOURS PRN
Qty: 15 TABLET | Refills: 0 | Status: ON HOLD | OUTPATIENT
Start: 2025-04-26 | End: 2025-04-29 | Stop reason: HOSPADM

## 2025-04-26 RX ORDER — CEPHALEXIN 500 MG/1
500 CAPSULE ORAL EVERY 6 HOURS
Qty: 20 CAPSULE | Refills: 0 | Status: ON HOLD | OUTPATIENT
Start: 2025-04-26 | End: 2025-04-29

## 2025-04-26 NOTE — TELEPHONE ENCOUNTER
Speaking with wife of pt who reports pt was seen by Dr. Vera yesterday, and was prescribed keflex, and oxycodone. States prescription were to be sent to [facility] but sent to [facility] pharmacy. Preferred pharmacy is [facility] [address]    11:15 page sent to ENT resident    Spoke with Dr. Hearn, who states he will send to [facility] pharmacy    Wife informed, and verbalized understanding.  Reason for Disposition   [1] Caller has URGENT medicine question about med that PCP or specialist prescribed AND [2] triager unable to answer question    Additional Information   Negative: [1] Intentional drug overdose AND [2] suicidal thoughts or ideas   Negative: MORE THAN A DOUBLE DOSE of a prescription or over-the-counter (OTC) drug   Negative: [1] DOUBLE DOSE (an extra dose or lesser amount) of prescription drug AND [2] any symptoms (e.g., dizziness, nausea, pain, sleepiness)   Negative: [1] DOUBLE DOSE (an extra dose or lesser amount) of over-the-counter (OTC) drug AND [2] any symptoms (e.g., dizziness, nausea, pain, sleepiness)   Negative: Took another person's prescription drug   Negative: [1] DOUBLE DOSE (an extra dose or lesser amount) of prescription drug AND [2] NO symptoms  (Exception: A double dose of antibiotics.)   Negative: Diabetes drug error or overdose (e.g., took wrong type of insulin or took extra dose)   Negative: [1] Prescription not at pharmacy AND [2] was prescribed by PCP recently (Exception: Triager has access to EMR and prescription is recorded there. Go to Home Care and confirm for pharmacy.)   Negative: [1] Pharmacy calling with prescription question AND [2] triager unable to answer question    Protocols used: Medication Question Call-A-

## 2025-04-28 ENCOUNTER — ANESTHESIA EVENT (OUTPATIENT)
Dept: SURGERY | Facility: OTHER | Age: 45
End: 2025-04-28
Payer: COMMERCIAL

## 2025-04-28 ENCOUNTER — HOSPITAL ENCOUNTER (OUTPATIENT)
Dept: RADIOLOGY | Facility: OTHER | Age: 45
Discharge: HOME | End: 2025-04-28
Attending: STUDENT IN AN ORGANIZED HEALTH CARE EDUCATION/TRAINING PROGRAM
Payer: COMMERCIAL

## 2025-04-28 DIAGNOSIS — R04.0 EPISTAXIS: ICD-10-CM

## 2025-04-28 PROCEDURE — 70486 CT MAXILLOFACIAL W/O DYE: CPT | Mod: TC

## 2025-04-28 PROCEDURE — 70486 CT MAXILLOFACIAL W/O DYE: CPT | Mod: 26,,, | Performed by: RADIOLOGY

## 2025-04-28 RX ORDER — ALPRAZOLAM 0.5 MG/1
0.5 TABLET ORAL 2 TIMES DAILY PRN
COMMUNITY
Start: 2025-04-25

## 2025-04-28 RX ORDER — FERROUS SULFATE 325(65) MG
325 TABLET ORAL DAILY
COMMUNITY

## 2025-04-28 RX ORDER — ACETAMINOPHEN 500 MG
500 TABLET ORAL EVERY 6 HOURS PRN
COMMUNITY

## 2025-04-28 NOTE — PRE-PROCEDURE INSTRUCTIONS
Information to Prepare you for your Surgery    PRE-ADMIT TESTING -  [phone]    [address] [facility]          Your surgery has been scheduled at [facility]. We are pleased to have the opportunity to serve you. For Further Information please call [phone].    On the day of surgery please report to the Information Desk on the 1st floor.    CONTACT YOUR PHYSICIAN'S OFFICE THE DAY PRIOR TO YOUR SURGERY TO OBTAIN YOUR ARRIVAL TIME.     The evening before surgery do not eat anything after 9 p.m. ( this includes hard candy, chewing gum and mints).  You may only have GATORADE, POWERADE AND WATER  from 9 p.m. until you leave your home.   DO NOT DRINK ANY LIQUIDS ON THE WAY TO THE HOSPITAL.      Why does your anesthesiologist allow you to drink Gatorade/Powerade before surgery?  Gatorade/Powerade helps to increase your comfort before surgery and to decrease your nausea after surgery. The carbohydrates in Gatorade/Powerade help reduce your body's stress response to surgery.  If you are a diabetic-drink only water prior to surgery.    Outpatient Surgery- May allow 2 adult (18 and older) Support Persons (1 being the designated ) for all surgical/procedural patients. A breastfeeding mother will be allowed her infant and 2 adult Support Persons. No one under the age of 18 will be allowed in the building.       MEDICATION INSTRUCTIONS:     Take the below medicines morning of surgery:    Cephalexin    If you take ASPIRIN - Your PHYSICIAN/SURGEON will inform you IF/OR when you need to stop taking aspirin prior to your surgery.     The week prior to surgery do not ot take any medications containing IBUPROFEN or NSAIDS ( Advil, Motrin, Goodys, BC, Aleve, Naproxen etc) If you are not sure if you should take a medicine please call your surgeon's office.  Ok to take Tylenol    Do Not Wear any make-up (especially eye make-up) to surgery. Please remove any false eyelashes or eyelash  extensions. If you arrive the day of surgery with makeup/eyelashes on you will be required to remove prior to surgery. (There is a risk of corneal abrasions if eye makeup/eyelash extensions are not removed)      Leave all valuables at home.   Do Not wear any jewelry or watches, including any metal in body piercings. Jewelry must be removed prior to coming to the hospital.  There is a possibility that rings that are unable to be removed may be cut off if they are on the surgical extremity.    Please remove all hair extensions, wigs, clips and any other metal accessories/ ornaments from your hair.  These items may pose a flammable/fire risk in Surgery and must be removed.    Do not shave your surgical area at least 5 days prior to your surgery. The surgical prep will be performed at the hospital according to Infection Control regulations.    Contact Lens must be removed before surgery. Either do not wear the contact lens or bring a case and solution for storage.  Please bring a container for eyeglasses or dentures as required.  Bring any paperwork your physician has provided, such as consent forms,  history and physicals, doctor's orders, etc.   Bring comfortable clothes that are loose fitting to wear upon discharge. Take into consideration the type of surgery being performed.  Maintain your diet as advised per your physician the day prior to surgery.      Adequate rest the night before surgery is advised.   Park in the Parking lot behind the hospital or in the [place] [facility] across the street from the parking lot. Parking is complimentary.  If you will be discharged the same day as your procedure, please arrange for a responsible adult to drive you home or to accompany you if traveling by taxi.   YOU WILL NOT BE PERMITTED TO DRIVE OR TO LEAVE THE HOSPITAL ALONE AFTER SURGERY.   If you are being discharged the same day, it is strongly recommended that you arrange for someone to remain with you for the first  24 hrs following your surgery.    The Surgeon will speak to your family/visitor after your surgery regarding the outcome of your surgery and post op care.  The Surgeon may speak to you after your surgery, but there is a possibility you may not remember the details.  Please check with your family members regarding the conversation with the Surgeon.    We strongly recommend whoever is bringing you home be present for discharge instructions.  This will ensure a thorough understanding for your post op home care.    If the patient has fever, cough, or signs/symptoms of Flu or Covid please do not come in for your surgery. Contact your surgeon and your primary care physician for further instructions.           Thank you for your cooperation.  The Staff of [facility].            Bathing Instructions with Hibiclens    Shower the evening before and morning of your procedure with Chlorhexidine (Hibiclens)  do not use Chlorhexidine on your face or genitals. Do not get in your eyes.  Wash your face with water and your regular face wash/soap  Use your regular shampoo  Apply Chlorhexidine (Hibiclens) directly on your skin or on a wet washcloth and wash gently. When showering: Move away from the shower stream when applying Chlorhexidine (Hibiclens) to avoid rinsing off too soon.  Rinse thoroughly with warm water  Do not dilute Chlorhexidine (Hibiclens)   Dry off as usual, do not use any deodorant, powder, body lotions, perfume, after shave or cologne.

## 2025-04-29 ENCOUNTER — HOSPITAL ENCOUNTER (OUTPATIENT)
Facility: OTHER | Age: 45
Discharge: HOME | End: 2025-04-29
Attending: STUDENT IN AN ORGANIZED HEALTH CARE EDUCATION/TRAINING PROGRAM | Admitting: STUDENT IN AN ORGANIZED HEALTH CARE EDUCATION/TRAINING PROGRAM
Payer: COMMERCIAL

## 2025-04-29 ENCOUNTER — ANESTHESIA (OUTPATIENT)
Dept: SURGERY | Facility: OTHER | Age: 45
End: 2025-04-29
Payer: COMMERCIAL

## 2025-04-29 DIAGNOSIS — B49 FUNGAL SINUSITIS: ICD-10-CM

## 2025-04-29 DIAGNOSIS — R04.0 LEFT-SIDED EPISTAXIS: Primary | ICD-10-CM

## 2025-04-29 DIAGNOSIS — R04.0 EPISTAXIS: ICD-10-CM

## 2025-04-29 DIAGNOSIS — J34.2 DEVIATED NASAL SEPTUM: ICD-10-CM

## 2025-04-29 DIAGNOSIS — J32.9 FUNGAL SINUSITIS: ICD-10-CM

## 2025-04-29 DIAGNOSIS — J34.89 NASAL VESTIBULITIS: ICD-10-CM

## 2025-04-29 PROCEDURE — 25000003 PHARM REV CODE 250: Performed by: ANESTHESIOLOGY

## 2025-04-29 PROCEDURE — 71000033 HC RECOVERY, INTIAL HOUR: Performed by: STUDENT IN AN ORGANIZED HEALTH CARE EDUCATION/TRAINING PROGRAM

## 2025-04-29 PROCEDURE — 25000003 PHARM REV CODE 250: Performed by: NURSE ANESTHETIST, CERTIFIED REGISTERED

## 2025-04-29 PROCEDURE — 63600175 PHARM REV CODE 636 W HCPCS: Performed by: STUDENT IN AN ORGANIZED HEALTH CARE EDUCATION/TRAINING PROGRAM

## 2025-04-29 PROCEDURE — 63600175 PHARM REV CODE 636 W HCPCS: Performed by: NURSE ANESTHETIST, CERTIFIED REGISTERED

## 2025-04-29 PROCEDURE — P9045 ALBUMIN (HUMAN), 5%, 250 ML: HCPCS | Mod: JZ,TB | Performed by: NURSE ANESTHETIST, CERTIFIED REGISTERED

## 2025-04-29 PROCEDURE — 63600175 PHARM REV CODE 636 W HCPCS: Performed by: ANESTHESIOLOGY

## 2025-04-29 PROCEDURE — 37000009 HC ANESTHESIA EA ADD 15 MINS: Performed by: STUDENT IN AN ORGANIZED HEALTH CARE EDUCATION/TRAINING PROGRAM

## 2025-04-29 PROCEDURE — 31241 NSL/SNS NDSC LIG SPHNPTN ART: CPT | Mod: LT,,, | Performed by: STUDENT IN AN ORGANIZED HEALTH CARE EDUCATION/TRAINING PROGRAM

## 2025-04-29 PROCEDURE — 30520 REPAIR OF NASAL SEPTUM: CPT | Mod: 51,,, | Performed by: STUDENT IN AN ORGANIZED HEALTH CARE EDUCATION/TRAINING PROGRAM

## 2025-04-29 PROCEDURE — 71000039 HC RECOVERY, EACH ADD'L HOUR: Performed by: STUDENT IN AN ORGANIZED HEALTH CARE EDUCATION/TRAINING PROGRAM

## 2025-04-29 PROCEDURE — 31256 EXPLORATION MAXILLARY SINUS: CPT | Mod: 51,LT,, | Performed by: STUDENT IN AN ORGANIZED HEALTH CARE EDUCATION/TRAINING PROGRAM

## 2025-04-29 PROCEDURE — 36000708 HC OR TIME LEV III 1ST 15 MIN: Performed by: STUDENT IN AN ORGANIZED HEALTH CARE EDUCATION/TRAINING PROGRAM

## 2025-04-29 PROCEDURE — 87075 CULTR BACTERIA EXCEPT BLOOD: CPT | Performed by: STUDENT IN AN ORGANIZED HEALTH CARE EDUCATION/TRAINING PROGRAM

## 2025-04-29 PROCEDURE — 27201423 OPTIME MED/SURG SUP & DEVICES STERILE SUPPLY: Performed by: STUDENT IN AN ORGANIZED HEALTH CARE EDUCATION/TRAINING PROGRAM

## 2025-04-29 PROCEDURE — 87186 SC STD MICRODIL/AGAR DIL: CPT | Performed by: STUDENT IN AN ORGANIZED HEALTH CARE EDUCATION/TRAINING PROGRAM

## 2025-04-29 PROCEDURE — 61782 SCAN PROC CRANIAL EXTRA: CPT | Mod: ,,, | Performed by: STUDENT IN AN ORGANIZED HEALTH CARE EDUCATION/TRAINING PROGRAM

## 2025-04-29 PROCEDURE — 88305 TISSUE EXAM BY PATHOLOGIST: CPT | Mod: TC | Performed by: STUDENT IN AN ORGANIZED HEALTH CARE EDUCATION/TRAINING PROGRAM

## 2025-04-29 PROCEDURE — 71000015 HC POSTOP RECOV 1ST HR: Performed by: STUDENT IN AN ORGANIZED HEALTH CARE EDUCATION/TRAINING PROGRAM

## 2025-04-29 PROCEDURE — 37000008 HC ANESTHESIA 1ST 15 MINUTES: Performed by: STUDENT IN AN ORGANIZED HEALTH CARE EDUCATION/TRAINING PROGRAM

## 2025-04-29 PROCEDURE — 36000710: Performed by: STUDENT IN AN ORGANIZED HEALTH CARE EDUCATION/TRAINING PROGRAM

## 2025-04-29 PROCEDURE — 71000016 HC POSTOP RECOV ADDL HR: Performed by: STUDENT IN AN ORGANIZED HEALTH CARE EDUCATION/TRAINING PROGRAM

## 2025-04-29 PROCEDURE — 36000711: Performed by: STUDENT IN AN ORGANIZED HEALTH CARE EDUCATION/TRAINING PROGRAM

## 2025-04-29 PROCEDURE — 63600175 PHARM REV CODE 636 W HCPCS

## 2025-04-29 RX ORDER — SODIUM CHLORIDE, SODIUM LACTATE, POTASSIUM CHLORIDE, CALCIUM CHLORIDE 600; 310; 30; 20 MG/100ML; MG/100ML; MG/100ML; MG/100ML
INJECTION, SOLUTION INTRAVENOUS CONTINUOUS
Status: DISCONTINUED | OUTPATIENT
Start: 2025-04-29 | End: 2025-04-29 | Stop reason: HOSPADM

## 2025-04-29 RX ORDER — LIDOCAINE HYDROCHLORIDE 10 MG/ML
0.5 INJECTION, SOLUTION EPIDURAL; INFILTRATION; INTRACAUDAL; PERINEURAL ONCE
Status: DISCONTINUED | OUTPATIENT
Start: 2025-04-29 | End: 2025-04-29 | Stop reason: HOSPADM

## 2025-04-29 RX ORDER — ESMOLOL HYDROCHLORIDE 10 MG/ML
INJECTION INTRAVENOUS
Status: DISCONTINUED | OUTPATIENT
Start: 2025-04-29 | End: 2025-04-29

## 2025-04-29 RX ORDER — FENTANYL CITRATE 50 UG/ML
INJECTION, SOLUTION INTRAMUSCULAR; INTRAVENOUS
Status: DISCONTINUED | OUTPATIENT
Start: 2025-04-29 | End: 2025-04-29

## 2025-04-29 RX ORDER — SODIUM CHLORIDE 0.9 % (FLUSH) 0.9 %
10 SYRINGE (ML) INJECTION
Status: DISCONTINUED | OUTPATIENT
Start: 2025-04-29 | End: 2025-04-29 | Stop reason: HOSPADM

## 2025-04-29 RX ORDER — MIDAZOLAM HYDROCHLORIDE 1 MG/ML
INJECTION INTRAMUSCULAR; INTRAVENOUS
Status: DISCONTINUED | OUTPATIENT
Start: 2025-04-29 | End: 2025-04-29

## 2025-04-29 RX ORDER — CEPHALEXIN 500 MG/1
500 CAPSULE ORAL EVERY 6 HOURS
Qty: 28 CAPSULE | Refills: 0 | Status: SHIPPED | OUTPATIENT
Start: 2025-04-29 | End: 2025-05-06

## 2025-04-29 RX ORDER — CEFAZOLIN 2 G/1
2 INJECTION, POWDER, FOR SOLUTION INTRAMUSCULAR; INTRAVENOUS
Status: COMPLETED | OUTPATIENT
Start: 2025-04-29 | End: 2025-04-29

## 2025-04-29 RX ORDER — ALBUMIN HUMAN 50 G/1000ML
SOLUTION INTRAVENOUS
Status: DISCONTINUED | OUTPATIENT
Start: 2025-04-29 | End: 2025-04-29

## 2025-04-29 RX ORDER — PROPOFOL 10 MG/ML
VIAL (ML) INTRAVENOUS
Status: DISCONTINUED | OUTPATIENT
Start: 2025-04-29 | End: 2025-04-29

## 2025-04-29 RX ORDER — SODIUM CHLORIDE 0.9 % (FLUSH) 0.9 %
3 SYRINGE (ML) INJECTION
Status: DISCONTINUED | OUTPATIENT
Start: 2025-04-29 | End: 2025-04-29 | Stop reason: HOSPADM

## 2025-04-29 RX ORDER — PROCHLORPERAZINE EDISYLATE 5 MG/ML
5 INJECTION INTRAMUSCULAR; INTRAVENOUS EVERY 30 MIN PRN
Status: DISCONTINUED | OUTPATIENT
Start: 2025-04-29 | End: 2025-04-29 | Stop reason: HOSPADM

## 2025-04-29 RX ORDER — OXYCODONE HYDROCHLORIDE 5 MG/1
5 TABLET ORAL EVERY 4 HOURS PRN
Status: DISCONTINUED | OUTPATIENT
Start: 2025-04-29 | End: 2025-04-29 | Stop reason: HOSPADM

## 2025-04-29 RX ORDER — LIDOCAINE HYDROCHLORIDE 20 MG/ML
INJECTION INTRAVENOUS
Status: DISCONTINUED | OUTPATIENT
Start: 2025-04-29 | End: 2025-04-29

## 2025-04-29 RX ORDER — DIPHENHYDRAMINE HYDROCHLORIDE 50 MG/ML
12.5 INJECTION, SOLUTION INTRAMUSCULAR; INTRAVENOUS EVERY 30 MIN PRN
Status: DISCONTINUED | OUTPATIENT
Start: 2025-04-29 | End: 2025-04-29 | Stop reason: HOSPADM

## 2025-04-29 RX ORDER — DEXAMETHASONE SODIUM PHOSPHATE 4 MG/ML
8 INJECTION, SOLUTION INTRA-ARTICULAR; INTRALESIONAL; INTRAMUSCULAR; INTRAVENOUS; SOFT TISSUE
Status: DISCONTINUED | OUTPATIENT
Start: 2025-04-29 | End: 2025-04-29 | Stop reason: HOSPADM

## 2025-04-29 RX ORDER — HYDROMORPHONE HYDROCHLORIDE 2 MG/ML
0.4 INJECTION, SOLUTION INTRAMUSCULAR; INTRAVENOUS; SUBCUTANEOUS EVERY 5 MIN PRN
Status: DISCONTINUED | OUTPATIENT
Start: 2025-04-29 | End: 2025-04-29 | Stop reason: HOSPADM

## 2025-04-29 RX ORDER — LIDOCAINE HYDROCHLORIDE 10 MG/ML
1 INJECTION, SOLUTION EPIDURAL; INFILTRATION; INTRACAUDAL; PERINEURAL ONCE
Status: DISCONTINUED | OUTPATIENT
Start: 2025-04-29 | End: 2025-04-29 | Stop reason: HOSPADM

## 2025-04-29 RX ORDER — HYDROMORPHONE HYDROCHLORIDE 2 MG/ML
INJECTION, SOLUTION INTRAMUSCULAR; INTRAVENOUS; SUBCUTANEOUS
Status: DISCONTINUED | OUTPATIENT
Start: 2025-04-29 | End: 2025-04-29

## 2025-04-29 RX ORDER — GLUCAGON 1 MG
1 KIT INJECTION
Status: DISCONTINUED | OUTPATIENT
Start: 2025-04-29 | End: 2025-04-29 | Stop reason: HOSPADM

## 2025-04-29 RX ORDER — PHENYLEPHRINE HYDROCHLORIDE 10 MG/ML
INJECTION INTRAVENOUS
Status: DISCONTINUED | OUTPATIENT
Start: 2025-04-29 | End: 2025-04-29

## 2025-04-29 RX ORDER — PROPOFOL 10 MG/ML
VIAL (ML) INTRAVENOUS CONTINUOUS PRN
Status: DISCONTINUED | OUTPATIENT
Start: 2025-04-29 | End: 2025-04-29

## 2025-04-29 RX ORDER — OXYCODONE HYDROCHLORIDE 5 MG/1
5 TABLET ORAL EVERY 6 HOURS PRN
Qty: 15 TABLET | Refills: 0 | Status: SHIPPED | OUTPATIENT
Start: 2025-04-29

## 2025-04-29 RX ORDER — ROCURONIUM BROMIDE 10 MG/ML
INJECTION, SOLUTION INTRAVENOUS
Status: DISCONTINUED | OUTPATIENT
Start: 2025-04-29 | End: 2025-04-29

## 2025-04-29 RX ORDER — ONDANSETRON HYDROCHLORIDE 2 MG/ML
INJECTION, SOLUTION INTRAVENOUS
Status: DISCONTINUED | OUTPATIENT
Start: 2025-04-29 | End: 2025-04-29

## 2025-04-29 RX ORDER — LIDOCAINE HYDROCHLORIDE AND EPINEPHRINE 10; 10 UG/ML; MG/ML
INJECTION, SOLUTION INFILTRATION; PERINEURAL
Status: DISCONTINUED | OUTPATIENT
Start: 2025-04-29 | End: 2025-04-29 | Stop reason: HOSPADM

## 2025-04-29 RX ADMIN — PROPOFOL 50 MCG/KG/MIN: 10 INJECTION, EMULSION INTRAVENOUS at 10:04

## 2025-04-29 RX ADMIN — SODIUM CHLORIDE, SODIUM LACTATE, POTASSIUM CHLORIDE, AND CALCIUM CHLORIDE: 600; 310; 30; 20 INJECTION, SOLUTION INTRAVENOUS at 01:04

## 2025-04-29 RX ADMIN — ROCURONIUM BROMIDE 30 MG: 10 INJECTION INTRAVENOUS at 02:04

## 2025-04-29 RX ADMIN — DEXAMETHASONE SODIUM PHOSPHATE 8 MG: 4 INJECTION, SOLUTION INTRAMUSCULAR; INTRAVENOUS at 10:04

## 2025-04-29 RX ADMIN — OXYCODONE HYDROCHLORIDE 5 MG: 5 TABLET ORAL at 02:04

## 2025-04-29 RX ADMIN — ROCURONIUM BROMIDE 50 MG: 10 INJECTION INTRAVENOUS at 10:04

## 2025-04-29 RX ADMIN — CEFAZOLIN 2 G: 2 INJECTION, POWDER, FOR SOLUTION INTRAMUSCULAR; INTRAVENOUS at 10:04

## 2025-04-29 RX ADMIN — PHENYLEPHRINE HYDROCHLORIDE 100 MCG: 10 INJECTION INTRAVENOUS at 11:04

## 2025-04-29 RX ADMIN — HYDROMORPHONE HYDROCHLORIDE 0.4 MG: 2 INJECTION INTRAMUSCULAR; INTRAVENOUS; SUBCUTANEOUS at 03:04

## 2025-04-29 RX ADMIN — ESMOLOL HYDROCHLORIDE 20 MG: 10 INJECTION, SOLUTION INTRAVENOUS at 02:04

## 2025-04-29 RX ADMIN — ROCURONIUM BROMIDE 20 MG: 10 INJECTION INTRAVENOUS at 01:04

## 2025-04-29 RX ADMIN — SODIUM CHLORIDE, SODIUM LACTATE, POTASSIUM CHLORIDE, AND CALCIUM CHLORIDE: 600; 310; 30; 20 INJECTION, SOLUTION INTRAVENOUS at 10:04

## 2025-04-29 RX ADMIN — MIDAZOLAM HYDROCHLORIDE 2 MG: 1 INJECTION, SOLUTION INTRAMUSCULAR; INTRAVENOUS at 10:04

## 2025-04-29 RX ADMIN — FENTANYL CITRATE 100 MCG: 50 INJECTION, SOLUTION INTRAMUSCULAR; INTRAVENOUS at 10:04

## 2025-04-29 RX ADMIN — SUGAMMADEX 200 MG: 100 INJECTION, SOLUTION INTRAVENOUS at 02:04

## 2025-04-29 RX ADMIN — LIDOCAINE HYDROCHLORIDE 100 MG: 20 INJECTION, SOLUTION INTRAVENOUS at 10:04

## 2025-04-29 RX ADMIN — PHENYLEPHRINE HYDROCHLORIDE 100 MCG: 10 INJECTION INTRAVENOUS at 10:04

## 2025-04-29 RX ADMIN — HYDROMORPHONE HYDROCHLORIDE 0.4 MG: 2 INJECTION INTRAMUSCULAR; INTRAVENOUS; SUBCUTANEOUS at 02:04

## 2025-04-29 RX ADMIN — PHENYLEPHRINE HYDROCHLORIDE 100 MCG: 10 INJECTION INTRAVENOUS at 01:04

## 2025-04-29 RX ADMIN — ROCURONIUM BROMIDE 20 MG: 10 INJECTION INTRAVENOUS at 11:04

## 2025-04-29 RX ADMIN — ROCURONIUM BROMIDE 30 MG: 10 INJECTION INTRAVENOUS at 11:04

## 2025-04-29 RX ADMIN — ROCURONIUM BROMIDE 20 MG: 10 INJECTION INTRAVENOUS at 12:04

## 2025-04-29 RX ADMIN — PROPOFOL 200 MG: 10 INJECTION, EMULSION INTRAVENOUS at 10:04

## 2025-04-29 RX ADMIN — PHENYLEPHRINE HYDROCHLORIDE 100 MCG: 10 INJECTION INTRAVENOUS at 12:04

## 2025-04-29 RX ADMIN — ONDANSETRON HYDROCHLORIDE 4 MG: 2 INJECTION INTRAMUSCULAR; INTRAVENOUS at 02:04

## 2025-04-29 RX ADMIN — HYDROMORPHONE HYDROCHLORIDE 0.2 MG: 2 INJECTION INTRAMUSCULAR; INTRAVENOUS; SUBCUTANEOUS at 02:04

## 2025-04-29 RX ADMIN — ALBUMIN (HUMAN) 250 ML: 12.5 SOLUTION INTRAVENOUS at 01:04

## 2025-04-29 RX ADMIN — CARBOXYMETHYLCELLULOSE SODIUM 2 DROP: 2.5 SOLUTION/ DROPS OPHTHALMIC at 10:04

## 2025-04-29 RX ADMIN — PROPOFOL 50 MG: 10 INJECTION, EMULSION INTRAVENOUS at 11:04

## 2025-04-29 NOTE — ANESTHESIA PROCEDURE NOTES
Intubation    Date/Time: 4/29/2025 10:37 AM    Performed by: Vero Tse  Authorized by: Yousuf Paiz MD    Intubation:     Induction:  Intravenous    Intubated:  Postinduction    Mask Ventilation:  Easy with oral airway    Attempts:  1    Attempted By:  Student    Method of Intubation:  Video laryngoscopy    Blade:  Cardona 3    Laryngeal View Grade: Grade I - full view of cords      Difficult Airway Encountered?: No      Complications:  None    Airway Device:  Oral endotracheal tube    Airway Device Size:  7.5    Style/Cuff Inflation:  Cuffed (inflated to minimal occlusive pressure)    Inflation Amount (mL):  6    Tube secured:  22    Secured at:  The lips    Placement Verified By:  Capnometry    Complicating Factors:  None    Findings Post-Intubation:  BS equal bilateral and atraumatic/condition of teeth unchanged

## 2025-04-29 NOTE — BRIEF OP NOTE
[facility] ([facility])  Brief Operative Note    Surgery Date: 4/29/2025     Surgeons and Role:     * Jaspreet Vera MD - Primary    Assisting Surgeon: None    Pre-op Diagnosis:  Epistaxis [R04.0]  Deviated nasal septum [J34.2]  Nasal vestibulitis [J34.89]    Post-op Diagnosis:  Post-Op Diagnosis Codes:     * Epistaxis [R04.0]     * Deviated nasal septum [J34.2]     * Nasal vestibulitis [J34.89]    Procedure(s) (LRB):  ENDOSCOPY, NOSE (N/A)  FESS USING COMPUTER ASSISTED NAVIGATION, WITH NASAL SEPTOPLASTY (Bilateral)    Anesthesia: General    Operative Findings: see op note    Estimated Blood Loss: * No values recorded between 4/29/2025 10:29 AM and 4/29/2025  2:22 PM *         Specimens:   Specimen (24h ago, onward)       Start     Ordered    04/29/25 1308  Specimen to Pathology ENT  RELEASE UPON ORDERING        References:    Click here for ordering Quick Tip   Question:  Release to patient  Answer:  Immediate    04/29/25 1308                    ID Type Source Tests Collected by Time Destination   1 : 1. Left maxillary sinus contents Tissue Sinus, Maxillary, Left SPECIMEN TO PATHOLOGY Jaspreet Vera MD 4/29/2025 1304    A : Left maxillary sinus contents Tissue Nose CULTURE, ANAEROBIC, CULTURE, AEROBIC  (SPECIFY SOURCE) Jaspreet Vera MD 4/29/2025 1309            Discharge Note    OUTCOME: Patient tolerated treatment/procedure well without complication and is now ready for discharge.    DISPOSITION: Home or Self Care    FINAL DIAGNOSIS:  <principal problem not specified>    FOLLOWUP: In clinic    DISCHARGE INSTRUCTIONS:  No discharge procedures on file.

## 2025-04-29 NOTE — PLAN OF CARE
Dr. Vera at bedside speaking to patient and going over discharge instructions.  Patient ok to be discharged to home

## 2025-04-29 NOTE — DISCHARGE INSTRUCTIONS
Do not blow your nose for 1 week.  Sneeze with your mouth open.  Change nasal dressing as needed.  Sleep with your head elevated (about 30 degrees) for 48 hours.  Take antibiotics as prescribed.  Take prescription pain medication as needed if pain is severe; otherwise, take extra-strength acetaminophen.  Do not take ibuprofen or aspirin for 1 week.  On the day after surgery, begin using saline nasal rinse (Neri Med) every morning and evening.  In case of excessive bleeding, spray Afrin (oxymetazoline) into the nostrils.  You may do this every 4 hours for up to 3 days if needed.  Follow up with  next week as planned.      FOR NON-URGENT ISSUES  For any postoperative issues please call [facility] Monday through Friday, 8am-5pm:  [phone]    If you are unable to reach anyone from the above listed number, please call the  at [facility] on [address]: [phone] (Ask to speak to the ENT resident or physician on call)    FOR EMERGENT ISSUES  For any emergent issues please go to the   [facility] (M-F 8a-3pm) OR  [facility] (after 3pm) OR   The nearest Emergency Department OR   Call [phone]

## 2025-04-29 NOTE — OP NOTE
Otolaryngology - Head and Neck Surgery   Facial Plastic and Reconstructive Surgery   Operative Note     Patient: Moo Burton       MRN: [id number]       DOS: 04/29/2025        Preoperative Diagnosis:      Severe epistaxis  Maxillary sinusitis     Postoperative Diagnosis:  Same.       Procedure(s) Performed:      Left SPA ligation  Septoplasty for access to sphenopalatine artery  Left maxillary antrostomy  Endoscopic sinus surgery and use of neuro navigation    Operative Indications:    This is a pleasant patient who presents with severe, recurrent epistaxis that is required multiple trips to the hospital in multiple rounds of nasal packing.  This case is medically urgent given the patient's current nasal packing and multiple trips to the emergency department with active bleeding over the past week.  After discussion of risks, benefits, and alternatives the patient has elected for left-sided functional endoscopic sinus surgery and sphenopalatine artery ligation.    Attending Surgeon: Jaspreet Vera MD  Resident (trainee): Terrence Cruz MD       Anesthesia: General    Specimens: none.   Drains: None.   Estimated Blood Loss: 15 mL.      Operative Findings:    Severe left-sided cartilaginous and bony spur with friable mucosa and oozing tissue along the edge and encroaching on the left ostiomeatal complex.  Spur also limited access to the sphenopalatine foramen.  Given this spur was likely the culprit for the patient's bleeding, intraoperative phone call to the patient's wife was made for a septoplasty to be performed in order to minimize risk of future epistaxis.  At baseline, the patient had a severe left caudal septal deviation.  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  Path and culture taken.  Globally inflamed sinonasal tissues     Procedure: After informed consent was obtained from the patient, the patient was brought from the preoperative holding area to the  operating room and placed supine on the operating room table. After smooth induction of general anesthesia and all parties were in agreement, the patient was turned toward the surgical team.     Next, using the sinus neuro navigation instrumentation, the patient's landmarks were registered using his CT scan.  These were confirmed with the probe.    Next, the left-sided nasal packing was removed in the nasal cavity was thoroughly irrigated.  The patient had an obvious severe left-sided cartilaginous and bony spur that had oozing mucosa along its edges and was likely the culprit for the patient's continued bleeding.  As such, local anesthesia was injected into the septum.  A bertha-transfixion incision was made and mucoperichondrial flaps were elevated bilaterally.  The spur was isolated on the left side and there was a rent along the sharpest most edge of the spur.  Both cartilaginous and bony portions of the spur were removed.  The right-sided mucoperichondrial flap was fully intact.    The hemitransfixion incision was closed with 4-0 plain gut in a trans columellar fashion.  The caudal septum was affixed to the anterior nasal spine with 4-0 PDS in a trans columellar fashion.  Next, the mucoperichondrial flaps were reapproximated with 4-0 Vicryl rapide.  After the septoplasty was performed, access was able to be obtained surgically to the left sphenopalatine foramen.      Next I proceeded with maxillary antrostomy to access the sphenopalatine artery foramen.  Using a ball-tip seeker, the uncinate process was medialized.  Using a backbiter, the uncinate process was reflected in the coronal plane and then the uncinate process was taken down up to the axilla with conservative use of the microdebrider.      Next, the natural os of the maxillary sinus was visually confirmed and entered with a ball-tip seeker.  The os was further dilated with a straight Adrian-Cut.  Copious fungal elements were encountered.  These were  suctioned free.  The maxillary sinus was then copiously irrigated until its contents were clear.  The antrostomy was further dilated using a Stammberger down biter and microdebrider to clean the edges.  Path and cultures were taken.      Next I proceeded with sphenopalatine artery ligation.  An incision was made along the vertical plate of the palatine bone at the level of the posterior aspect of the maxillary sinus.  Flap elevation proceeded along the bone until the Cassie ethmoidalis was encountered.  At this point, the sphenopalatine artery was identified.  It was isolated superiorly and inferiorly using the ball-tip seeker.  Portions of the Cassie ethmoidalis were removed sharply with a Kerrison rongeur.  The sphenopalatine artery was then cauterized with endoscopic bipolar along the visualized length of the artery to adequate effect.  The adjacent portions of the mucosa were cauterized with suction Bovie.      Sinus cavity was then further irrigated.  NasoPore absorbable packing was placed in the left maxillary sinus.  Gordon splints were placed.    The patient's care was turned over to the Anesthesia team who successfully awakened the patient without event. The patient was transported to the PACU in stable condition. All instrument, sharp and sponge counts were correct at the end of the case.      I was present for the entire surgery and personally performed all critical aspects of the surgery. I directly supervised the resident physician throughout the case.     Jaspreet Vera MD  Facial Plastic and Reconstructive Surgeon  [facility] of Otolaryngology - Head & Neck Surgery

## 2025-04-29 NOTE — OR NURSING
Dr. Vera contacted pt's significant other via phone to obtain verbal consent for septoplasty and any other indicated procedures.

## 2025-04-29 NOTE — TRANSFER OF CARE
"Anesthesia Transfer of Care Note    Patient: Moo Burton    Procedure(s) Performed: Procedure(s) (LRB):  ENDOSCOPY, NOSE (N/A)  FESS USING COMPUTER ASSISTED NAVIGATION, WITH NASAL SEPTOPLASTY (Bilateral)    Patient location: PACU    Anesthesia Type: general    Transport from OR: Transported from OR on 6-10 L/min O2 by face mask with adequate spontaneous ventilation    Post pain: adequate analgesia    Post assessment: no apparent anesthetic complications and tolerated procedure well    Post vital signs: stable    Level of consciousness: awake and alert    Nausea/Vomiting: no nausea/vomiting    Complications: none    Transfer of care protocol was followed    Last vitals: Visit Vitals  BP (!) 140/78 (BP Location: Right arm, Patient Position: Lying)   Pulse 99   Temp 37 °C (98.6 °F) (Skin)   Resp 16   Ht 5' 9" (1.753 m)   Wt 59 kg (130 lb)   SpO2 100%   BMI 19.20 kg/m²     "

## 2025-04-29 NOTE — ANESTHESIA POSTPROCEDURE EVALUATION
Anesthesia Post Evaluation    Patient: Moo Burton    Procedure(s) Performed: Procedure(s) (LRB):  ENDOSCOPY, NOSE (N/A)  FESS USING COMPUTER ASSISTED NAVIGATION, WITH NASAL SEPTOPLASTY (Bilateral)    Final Anesthesia Type: general      Patient location during evaluation: PACU  Patient participation: Yes- Able to Participate  Level of consciousness: awake and alert  Post-procedure vital signs: reviewed and stable  Pain management: adequate  Airway patency: patent  ELENA mitigation strategies: Extubation while patient is awake  PONV status at discharge: No PONV  Anesthetic complications: no      Cardiovascular status: hemodynamically stable  Respiratory status: unassisted  Hydration status: euvolemic  Follow-up not needed.              Vitals Value Taken Time   /80 04/29/25 16:48   Temp 98 04/29/25 16:48   Pulse 72 04/29/25 16:48   Resp 16 04/29/25 16:48   SpO2 100 04/29/25 16:48         Event Time   Out of Recovery 15:31:00         Pain/Amanda Score: Pain Rating Prior to Med Admin: 7 (4/29/2025  3:03 PM)  Pain Rating Post Med Admin: 3 (4/29/2025  3:30 PM)  Amanda Score: 10 (4/29/2025  4:10 PM)

## 2025-04-29 NOTE — PLAN OF CARE
Moo Burton has met all discharge criteria from Phase II. Vital Signs are stable, ambulating  without difficulty. Discharge instructions given, patient verbalized understanding. Discharged from facility via wheelchair in stable condition.

## 2025-04-29 NOTE — ANESTHESIA PREPROCEDURE EVALUATION
04/29/2025  Moo Burton is a 44 y.o., male.      Pre-op Assessment    I have reviewed the Patient Summary Reports.     I have reviewed the Nursing Notes. I have reviewed the NPO Status.   I have reviewed the Medications.     Review of Systems  Anesthesia Hx:  No problems with previous Anesthesia             Denies Family Hx of Anesthesia complications.    Denies Personal Hx of Anesthesia complications.                    Social:  Alcohol Use, Smoker       Hematology/Oncology:    Oncology Normal    -- Anemia:               Hematology Comments: Hgb 12.3                    Cardiovascular:  Cardiovascular Normal Exercise tolerance: good                                             Pulmonary:  Pulmonary Normal                       Renal/:  Renal/ Normal                 Hepatic/GI:  Hepatic/GI Normal                    Musculoskeletal:  Musculoskeletal Normal                Neurological:           Trigeminal Neuralgis      Peripheral Neuropathy                          Endocrine:  Endocrine Normal            Psych:  Psychiatric Normal                Physical Exam  General: Well nourished and Cooperative    Airway:  Mallampati: II   Mouth Opening: Normal  TM Distance: Normal  Neck ROM: Normal ROM    Dental:  Intact    Anesthesia Plan  Type of Anesthesia, risks & benefits discussed:    Anesthesia Type: Gen ETT  Intra-op Monitoring Plan: Standard ASA Monitors  Post Op Pain Control Plan: multimodal analgesia  Induction:  IV  Airway Plan: Video  Informed Consent: Informed consent signed with the Patient and all parties understand the risks and agree with anesthesia plan.  All questions answered.   ASA Score: 2  Day of Surgery Review of History & Physical: H&P Update referred to the surgeon/provider.    Ready For Surgery From Anesthesia Perspective.   .

## 2025-04-30 VITALS
SYSTOLIC BLOOD PRESSURE: 136 MMHG | HEIGHT: 69 IN | WEIGHT: 130 LBS | DIASTOLIC BLOOD PRESSURE: 70 MMHG | HEART RATE: 74 BPM | OXYGEN SATURATION: 100 % | BODY MASS INDEX: 19.26 KG/M2 | RESPIRATION RATE: 18 BRPM | TEMPERATURE: 99 F

## 2025-05-01 LAB
ESTROGEN SERPL-MCNC: NORMAL PG/ML
INSULIN SERPL-ACNC: NORMAL U[IU]/ML
LAB AP CLINICAL INFORMATION: NORMAL
LAB AP GROSS DESCRIPTION: NORMAL
LAB AP PERFORMING LOCATION(S): NORMAL
LAB AP REPORT FOOTNOTES: NORMAL

## 2025-05-01 NOTE — PROGRESS NOTES
OTOLARYNGOLOGY- FACIAL PLASTIC & RECONSTRUCTIVE SURGERY      Moo Burton  [id number]      HISTORY OF PRESENT ILLNESS: Moo Burton  is a 44 y.o. male who was previously seen  Moo reports a multi month history of more frequent nose bleeds that happen to be left sided. No AC. No prior nasal surgery. Patient is a smoker and heavy drinker.  Previously saw Dr. Nieto in early April. Has had more frequent and more intense nosebleeds recently.    Was seen at OSH ED then transferred to [facility] where I was consulted and admitted him with bilateral packing. He was observed overnight then discharged which unilateral pack. Has continued to bleed and represented to [facility] for additional packing and subsequently discharged.     4/29/2025: Surgery  Operative Findings:    Severe left-sided cartilaginous and bony spur with friable mucosa and oozing tissue along the edge and encroaching on the left ostiomeatal complex.  Spur also limited access to the sphenopalatine foramen.  Given this spur was likely the culprit for the patient's bleeding, intraoperative phone call to the patient's wife was made for a septoplasty to be performed in order to minimize risk of future epistaxis.  At baseline, the patient had a severe left caudal septal deviation.  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  Path and culture taken.  Globally inflamed sinonasal tissues    5/5/2025:  Patient presents to clinic for POV 1. He has been doing irrigations and getting pink, thin output. No bleeding. He is still having significant pain.      Past Medical History  He has a past medical history of Snores.    Past Surgical History  He has a past surgical history that includes Trigeminal nerve decompression; Nasal endoscopy (N/A, 4/29/2025); and fess, using computer-assisted navigation, with nasal turbinate reduction (Bilateral, 4/29/2025).    Family History  His family history is not on file.    Social History  He  "reports that he has been smoking. He does not have any smokeless tobacco history on file. He reports current alcohol use. He reports current drug use. Drugs: Oxycodone and Benzodiazepines.    Allergies  He has no known allergies.    Medications  He has a current medication list which includes the following prescription(s): acetaminophen, alprazolam, cephalexin, ferrous sulfate, ibuprofen, and oxycodone.      Review of Systems     Constitutional: Positive for fatigue.      HENT: Positive for nosebleeds, sinus pressure and sore throat.      Eyes:  Negative for change in eyesight, eye drainage, eye itching and photophobia.     Respiratory:  Positive for sleep apnea and snoring.      Cardiovascular:  Negative for chest pain, foot swelling, irregular heartbeat and swollen veins.     Gastrointestinal:  Positive for vomiting.     Genitourinary: Negative for difficulty urinating, sexual problems and frequent urination.     Musc: Positive for back pain.     Skin: Negative for rash.     Allergy: Negative for food allergies and seasonal allergies.     Endocrine: Negative for cold intolerance and heat intolerance.      Neurological: Positive for headaches.     Hematologic: Positive for bruises/bleeds easily.     Psychiatric: Negative for decreased concentration, depression, nervous/anxious and sleep disturbance.              PHYSICAL EXAM:  /72 (BP Location: Left arm, Patient Position: Sitting)   Pulse 96   Ht 5' 9" (1.753 m)   Wt 54.1 kg (119 lb 4.8 oz)   BMI 17.62 kg/m²     General: Alert and oriented in no acute distress  Head and Face: Normocephaic, atruamatic  Nose: no active bleeding doyles removed      Procedure: Sinus debridement  Surgeon: Jaspreet Vera M.D.  Indication for Procedure:  The patient's diagnostic workup requires a full evaluation of the sinonasal regions in addition to necessary debridement after sinus surgery  Anesthesia:  Topical Afrin/Pontocaine spray.    Details of Procedure:  After adequate " anesthesia had been achieved, using a0 degree rigid endoscope, the sinus cavities were thoroughly suctioned and debrided of crusting and clot.  Sorry antrostomy was patent and secretions were adequately suctioned.  The patient tolerated the procedure well.          ASSESSMENT:   1. Left-sided epistaxis              PLAN:     The patient is healing well.  Still in some pain and we refill his pain medicine.  Continue antibiotics as prescribed.  We will see him in 2 weeks for repeat examined debridement.    Voice recognition software was used in the creation of this note/communication and any sound-alike errors which may have occurred from its use should be taken in context when interpreting. If such errors prevent a clear understanding of the note/communication, please contact the office for clarification.       Jaspreet Vera MD  Facial Plastic and Reconstructive Surgeon  [facility] Otolaryngology - Head & Neck Surgery

## 2025-05-02 LAB — BACTERIA SPEC ANAEROBE CULT: NORMAL

## 2025-05-03 LAB — BACTERIA SPEC AEROBE CULT: ABNORMAL

## 2025-05-04 ENCOUNTER — PATIENT MESSAGE (OUTPATIENT)
Dept: OTOLARYNGOLOGY | Facility: CLINIC | Age: 45
End: 2025-05-04
Payer: COMMERCIAL

## 2025-05-05 ENCOUNTER — OFFICE VISIT (OUTPATIENT)
Dept: OTOLARYNGOLOGY | Facility: CLINIC | Age: 45
End: 2025-05-05
Payer: COMMERCIAL

## 2025-05-05 VITALS
HEIGHT: 69 IN | HEART RATE: 96 BPM | BODY MASS INDEX: 17.67 KG/M2 | WEIGHT: 119.31 LBS | DIASTOLIC BLOOD PRESSURE: 72 MMHG | SYSTOLIC BLOOD PRESSURE: 130 MMHG

## 2025-05-05 DIAGNOSIS — J32.9 FUNGAL SINUSITIS: Primary | ICD-10-CM

## 2025-05-05 DIAGNOSIS — R04.0 LEFT-SIDED EPISTAXIS: ICD-10-CM

## 2025-05-05 DIAGNOSIS — B49 FUNGAL SINUSITIS: Primary | ICD-10-CM

## 2025-05-05 PROCEDURE — 99024 POSTOP FOLLOW-UP VISIT: CPT | Mod: S$GLB,,, | Performed by: STUDENT IN AN ORGANIZED HEALTH CARE EDUCATION/TRAINING PROGRAM

## 2025-05-05 PROCEDURE — 31237 NSL/SINS NDSC SURG BX POLYPC: CPT | Mod: 79,50,S$GLB, | Performed by: STUDENT IN AN ORGANIZED HEALTH CARE EDUCATION/TRAINING PROGRAM

## 2025-05-05 RX ORDER — OXYCODONE HYDROCHLORIDE 5 MG/1
5 TABLET ORAL EVERY 4 HOURS PRN
Qty: 15 TABLET | Refills: 0 | Status: SHIPPED | OUTPATIENT
Start: 2025-05-05

## 2025-05-05 NOTE — LETTER
May 5, 2025      [facility]  [address], [address]  [facility] [address]  Phone: [phone]  Fax: [phone]       Patient: Moo Burton   YOB: 1980  Date of Visit: 05/05/2025    To Whom It May Concern:    Vannesa Burton  was at [facility] on 05/05/2025 for a post operative visit. His surgery was on 4/29/2025. The patient may return to work on 5/5/2025 with restrictions. He is not allowed lift more than 10 lbs, bend over, or do strenuous work until 5/13/2025. If you have any questions or concerns, or if I can be of further assistance, please do not hesitate to contact me.    Sincerely,    Misty Ruvalcaba RN

## 2025-05-12 ENCOUNTER — PATIENT MESSAGE (OUTPATIENT)
Dept: OTOLARYNGOLOGY | Facility: CLINIC | Age: 45
End: 2025-05-12
Payer: COMMERCIAL

## 2025-05-12 ENCOUNTER — TELEPHONE (OUTPATIENT)
Dept: OTOLARYNGOLOGY | Facility: CLINIC | Age: 45
End: 2025-05-12
Payer: COMMERCIAL

## 2025-05-12 NOTE — TELEPHONE ENCOUNTER
"Spoke with patient's spouse to discuss his post op symptoms. Per Dr. Vera, I advised her to encourage Mr. Burton to continue doing the saline irrigations twice daily and to alternate Ibuprofen 600 and Tylenol 1000 every 3 hours. She also has concern for his left nostril being congested and looking "collapsed". I asked her to send a picture of this through the portal.  "

## 2025-05-13 NOTE — PROGRESS NOTES
OTOLARYNGOLOGY- FACIAL PLASTIC & RECONSTRUCTIVE SURGERY      Moo Burton  [id number]      HISTORY OF PRESENT ILLNESS: Moo Burton  is a 44 y.o. male who was previously seen  Moo reports a multi month history of more frequent nose bleeds that happen to be left sided. No AC. No prior nasal surgery. Patient is a smoker and heavy drinker.  Previously saw Dr. Nieto in early April. Has had more frequent and more intense nosebleeds recently.    Was seen at [facility] ED then transferred to [facility] [place], operated by [facility] where I was consulted and admitted him with bilateral packing. He was observed overnight then discharged which unilateral pack. Has continued to bleed and represented to [facility] for additional packing and subsequently discharged.     4/29/2025: Surgery  Operative Findings:    Severe left-sided cartilaginous and bony spur with friable mucosa and oozing tissue along the edge and encroaching on the left ostiomeatal complex.  Spur also limited access to the sphenopalatine foramen.  Given this spur was likely the culprit for the patient's bleeding, intraoperative phone call to the patient's wife was made for a septoplasty to be performed in order to minimize risk of future epistaxis.  At baseline, the patient had a severe left caudal septal deviation.  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  Path and culture taken.  Globally inflamed sinonasal tissues    5/5/2025:  Patient presents to clinic for POV 1. He has been doing irrigations and getting pink, thin output. No bleeding. He is still having significant pain.      5/20/2025:  In clinic for POV 2. He has had some sinus pressure and increased congestion on the left side of his nose.     Past Medical History  He has a past medical history of Snores.    Past Surgical History  He has a past surgical history that includes Trigeminal nerve decompression; Nasal endoscopy (N/A, 4/29/2025); and fess, using computer-assisted navigation, with nasal  "turbinate reduction (Bilateral, 4/29/2025).    Family History  His family history is not on file.    Social History  He reports that he has been smoking. He does not have any smokeless tobacco history on file. He reports current alcohol use. He reports current drug use. Drugs: Oxycodone and Benzodiazepines.    Allergies  He has no known allergies.    Medications  He has a current medication list which includes the following prescription(s): acetaminophen, alprazolam, ferrous sulfate, ibuprofen, and oxycodone.      Review of Systems     Constitutional: Positive for fatigue.      HENT: Positive for nosebleeds, sinus pressure and sore throat.      Eyes:  Negative for change in eyesight, eye drainage, eye itching and photophobia.     Respiratory:  Positive for sleep apnea and snoring.      Cardiovascular:  Negative for chest pain, foot swelling, irregular heartbeat and swollen veins.     Gastrointestinal:  Positive for vomiting.     Genitourinary: Negative for difficulty urinating, sexual problems and frequent urination.     Musc: Positive for back pain.     Skin: Negative for rash.     Allergy: Negative for food allergies and seasonal allergies.     Endocrine: Negative for cold intolerance and heat intolerance.      Neurological: Positive for headaches.     Hematologic: Positive for bruises/bleeds easily.     Psychiatric: Negative for decreased concentration, depression, nervous/anxious and sleep disturbance.              PHYSICAL EXAM:  /80 (BP Location: Left arm, Patient Position: Sitting)   Pulse 95   Temp 97.5 °F (36.4 °C) (Temporal)   Ht 5' 9" (1.753 m)   Wt 59 kg (130 lb)   BMI 19.20 kg/m²     General: Alert and oriented in no acute distress  Head and Face: Normocephaic, atruamatic  Nose: no active bleeding; left caudal deviation slightly more prominent than baseline.       Procedure: Sinus debridement  Surgeon: Jaspreet Vera M.D.  Indication for Procedure:  The patient's diagnostic workup requires a " full evaluation of the sinonasal regions in addition to necessary debridement after sinus surgery  Anesthesia:  Topical Afrin/Pontocaine spray.    Details of Procedure:  After adequate anesthesia had been achieved, using a 30 degree rigid endoscope, the sinus cavities were thoroughly suctioned and debrided of crusting and clot.  Maxillary antrostomy was patent and secretions were adequately suctioned.  There was a small synechiae along the superior aspect of the head of the middle turbinate that was lysed with scissors.  Next, using fibular packing the middle turbinate was medialized to avoid further synechiae formation.  The patient tolerated the procedure well.          ASSESSMENT:   1. Left-sided epistaxis                PLAN:     The patient is healing well.  No active bleeding and pain has decreased.  There is still some left-sided nasal congestion which we will continue to monitor. RTC 2 weeks to monitor synechia re formation    Voice recognition software was used in the creation of this note/communication and any sound-alike errors which may have occurred from its use should be taken in context when interpreting. If such errors prevent a clear understanding of the note/communication, please contact the office for clarification.       Jaspreet Vera MD  Facial Plastic and Reconstructive Surgeon  [facility] Otol[facility] - Head & Neck Surgery

## 2025-05-20 ENCOUNTER — OFFICE VISIT (OUTPATIENT)
Dept: OTOLARYNGOLOGY | Facility: CLINIC | Age: 45
End: 2025-05-20
Payer: COMMERCIAL

## 2025-05-20 VITALS
WEIGHT: 130 LBS | BODY MASS INDEX: 19.26 KG/M2 | HEART RATE: 95 BPM | DIASTOLIC BLOOD PRESSURE: 80 MMHG | SYSTOLIC BLOOD PRESSURE: 126 MMHG | HEIGHT: 69 IN | TEMPERATURE: 98 F

## 2025-05-20 DIAGNOSIS — J32.0 CHRONIC MAXILLARY SINUSITIS: ICD-10-CM

## 2025-05-20 DIAGNOSIS — R04.0 LEFT-SIDED EPISTAXIS: Primary | ICD-10-CM

## 2025-05-20 PROCEDURE — 1159F MED LIST DOCD IN RCRD: CPT | Mod: CPTII,S$GLB,, | Performed by: STUDENT IN AN ORGANIZED HEALTH CARE EDUCATION/TRAINING PROGRAM

## 2025-05-20 PROCEDURE — 3074F SYST BP LT 130 MM HG: CPT | Mod: CPTII,S$GLB,, | Performed by: STUDENT IN AN ORGANIZED HEALTH CARE EDUCATION/TRAINING PROGRAM

## 2025-05-20 PROCEDURE — 31237 NSL/SINS NDSC SURG BX POLYPC: CPT | Mod: 79,LT,S$GLB, | Performed by: STUDENT IN AN ORGANIZED HEALTH CARE EDUCATION/TRAINING PROGRAM

## 2025-05-20 PROCEDURE — 3079F DIAST BP 80-89 MM HG: CPT | Mod: CPTII,S$GLB,, | Performed by: STUDENT IN AN ORGANIZED HEALTH CARE EDUCATION/TRAINING PROGRAM

## 2025-05-20 PROCEDURE — 99024 POSTOP FOLLOW-UP VISIT: CPT | Mod: S$GLB,,, | Performed by: STUDENT IN AN ORGANIZED HEALTH CARE EDUCATION/TRAINING PROGRAM

## 2025-06-10 NOTE — PROGRESS NOTES
OTOLARYNGOLOGY- FACIAL PLASTIC & RECONSTRUCTIVE SURGERY      Moo Burton  [id number]      HISTORY OF PRESENT ILLNESS: Moo Burton  is a 44 y.o. male who was previously seen  Moo reports a multi month history of more frequent nose bleeds that happen to be left sided. No AC. No prior nasal surgery. Patient is a smoker and heavy drinker.  Previously saw Dr. Nieto in early April. Has had more frequent and more intense nosebleeds recently.    Was seen at [facility] ED then transferred to [facility] where I was consulted and admitted him with bilateral packing. He was observed overnight then discharged which unilateral pack. Has continued to bleed and represented to [facility] for additional packing and subsequently discharged.     4/29/2025: Surgery  Operative Findings:    Severe left-sided cartilaginous and bony spur with friable mucosa and oozing tissue along the edge and encroaching on the left ostiomeatal complex.  Spur also limited access to the sphenopalatine foramen.  Given this spur was likely the culprit for the patient's bleeding, intraoperative phone call to the patient's wife was made for a septoplasty to be performed in order to minimize risk of future epistaxis.  At baseline, the patient had a severe left caudal septal deviation.  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  Path and culture taken.  Globally inflamed sinonasal tissues    5/5/2025:  Patient presents to clinic for POV 1. He has been doing irrigations and getting pink, thin output. No bleeding. He is still having significant pain.      5/20/2025:  In clinic for POV 2. He has had some sinus pressure and increased congestion on the left side of his nose.     6/11/20525:  Patient returns to clinic for f/u. He is feeling well overall. His nose is still a little tender and congested on the left side. But no active bleeding and his headaches are decreasing.     Past Medical History  He has a past medical history of  "Snores.    Past Surgical History  He has a past surgical history that includes Trigeminal nerve decompression; Nasal endoscopy (N/A, 4/29/2025); and fess, using computer-assisted navigation, with nasal turbinate reduction (Bilateral, 4/29/2025).    Family History  His family history is not on file.    Social History  He reports that he has been smoking. He does not have any smokeless tobacco history on file. He reports current alcohol use. He reports current drug use. Drugs: Oxycodone and Benzodiazepines.    Allergies  He has no known allergies.    Medications  He has a current medication list which includes the following prescription(s): alprazolam, ferrous sulfate, ibuprofen, acetaminophen, and oxycodone.      Review of Systems     Constitutional: Positive for fatigue.      HENT: Positive for nosebleeds, sinus pressure and sore throat.      Eyes:  Negative for change in eyesight, eye drainage, eye itching and photophobia.     Respiratory:  Positive for sleep apnea and snoring.      Cardiovascular:  Negative for chest pain, foot swelling, irregular heartbeat and swollen veins.     Gastrointestinal:  Positive for vomiting.     Genitourinary: Negative for difficulty urinating, sexual problems and frequent urination.     Musc: Positive for back pain.     Skin: Negative for rash.     Allergy: Negative for food allergies and seasonal allergies.     Endocrine: Negative for cold intolerance and heat intolerance.      Neurological: Positive for headaches.     Hematologic: Positive for bruises/bleeds easily.     Psychiatric: Negative for decreased concentration, depression, nervous/anxious and sleep disturbance.              PHYSICAL EXAM:  /85   Pulse 103   Ht 5' 9" (1.753 m)   Wt 59 kg (130 lb 1.1 oz)   BMI 19.21 kg/m²     General: Alert and oriented in no acute distress  Head and Face: Normocephaic, atruamatic  Nose: no active bleeding; left caudal deviation; left maxillary antrostomy is patent and well healed " with no evidence of re-formation of synechiae.      Procedure: Sinus debridement - prior visit  Surgeon: Jaspreet Vera M.D.  Indication for Procedure:  The patient's diagnostic workup requires a full evaluation of the sinonasal regions in addition to necessary debridement after sinus surgery  Anesthesia:  Topical Afrin/Pontocaine spray.    Details of Procedure:  After adequate anesthesia had been achieved, using a 30 degree rigid endoscope, the sinus cavities were thoroughly suctioned and debrided of crusting and clot.  Maxillary antrostomy was patent and secretions were adequately suctioned.  There was a small synechiae along the superior aspect of the head of the middle turbinate that was lysed with scissors.  Next, using fibular packing the middle turbinate was medialized to avoid further synechiae formation.  The patient tolerated the procedure well.          ASSESSMENT:   1. Left-sided epistaxis    2. Chronic maxillary sinusitis [J32.0]                  PLAN:     The patient is healing well.  No active bleeding and pain has decreased.  We will have him follow up in August.  The patient has some left-sided nasal congestion which he did have at baseline in his overall not overly symptomatic or bothered by this.    Voice recognition software was used in the creation of this note/communication and any sound-alike errors which may have occurred from its use should be taken in context when interpreting. If such errors prevent a clear understanding of the note/communication, please contact the office for clarification.       Jaspreet Vera MD  Facial Plastic and Reconstructive Surgeon  [facility] of Otolaryngology - Head & Neck Surgery

## 2025-06-11 ENCOUNTER — OFFICE VISIT (OUTPATIENT)
Dept: OTOLARYNGOLOGY | Facility: CLINIC | Age: 45
End: 2025-06-11
Payer: COMMERCIAL

## 2025-06-11 VITALS
HEIGHT: 69 IN | DIASTOLIC BLOOD PRESSURE: 85 MMHG | HEART RATE: 103 BPM | BODY MASS INDEX: 19.26 KG/M2 | WEIGHT: 130.06 LBS | SYSTOLIC BLOOD PRESSURE: 126 MMHG

## 2025-06-11 DIAGNOSIS — R04.0 LEFT-SIDED EPISTAXIS: Primary | ICD-10-CM

## 2025-06-11 DIAGNOSIS — J32.0 CHRONIC MAXILLARY SINUSITIS: ICD-10-CM

## 2025-06-11 PROCEDURE — 3079F DIAST BP 80-89 MM HG: CPT | Mod: CPTII,S$GLB,, | Performed by: STUDENT IN AN ORGANIZED HEALTH CARE EDUCATION/TRAINING PROGRAM

## 2025-06-11 PROCEDURE — 1159F MED LIST DOCD IN RCRD: CPT | Mod: CPTII,S$GLB,, | Performed by: STUDENT IN AN ORGANIZED HEALTH CARE EDUCATION/TRAINING PROGRAM

## 2025-06-11 PROCEDURE — 99024 POSTOP FOLLOW-UP VISIT: CPT | Mod: S$GLB,,, | Performed by: STUDENT IN AN ORGANIZED HEALTH CARE EDUCATION/TRAINING PROGRAM

## 2025-06-11 PROCEDURE — 3074F SYST BP LT 130 MM HG: CPT | Mod: CPTII,S$GLB,, | Performed by: STUDENT IN AN ORGANIZED HEALTH CARE EDUCATION/TRAINING PROGRAM

## 2025-06-11 PROCEDURE — 1160F RVW MEDS BY RX/DR IN RCRD: CPT | Mod: CPTII,S$GLB,, | Performed by: STUDENT IN AN ORGANIZED HEALTH CARE EDUCATION/TRAINING PROGRAM

## 2025-08-13 ENCOUNTER — OFFICE VISIT (OUTPATIENT)
Dept: OTOLARYNGOLOGY | Facility: CLINIC | Age: 45
End: 2025-08-13
Payer: COMMERCIAL

## 2025-08-13 VITALS
SYSTOLIC BLOOD PRESSURE: 131 MMHG | DIASTOLIC BLOOD PRESSURE: 86 MMHG | WEIGHT: 120.81 LBS | BODY MASS INDEX: 17.84 KG/M2 | HEART RATE: 72 BPM

## 2025-08-13 DIAGNOSIS — R04.0 LEFT-SIDED EPISTAXIS: Primary | ICD-10-CM

## 2025-08-13 PROCEDURE — 3079F DIAST BP 80-89 MM HG: CPT | Mod: CPTII,S$GLB,, | Performed by: STUDENT IN AN ORGANIZED HEALTH CARE EDUCATION/TRAINING PROGRAM

## 2025-08-13 PROCEDURE — 1159F MED LIST DOCD IN RCRD: CPT | Mod: CPTII,S$GLB,, | Performed by: STUDENT IN AN ORGANIZED HEALTH CARE EDUCATION/TRAINING PROGRAM

## 2025-08-13 PROCEDURE — 1160F RVW MEDS BY RX/DR IN RCRD: CPT | Mod: CPTII,S$GLB,, | Performed by: STUDENT IN AN ORGANIZED HEALTH CARE EDUCATION/TRAINING PROGRAM

## 2025-08-13 PROCEDURE — 3008F BODY MASS INDEX DOCD: CPT | Mod: CPTII,S$GLB,, | Performed by: STUDENT IN AN ORGANIZED HEALTH CARE EDUCATION/TRAINING PROGRAM

## 2025-08-13 PROCEDURE — 99213 OFFICE O/P EST LOW 20 MIN: CPT | Mod: S$GLB,,, | Performed by: STUDENT IN AN ORGANIZED HEALTH CARE EDUCATION/TRAINING PROGRAM

## 2025-08-13 PROCEDURE — 3075F SYST BP GE 130 - 139MM HG: CPT | Mod: CPTII,S$GLB,, | Performed by: STUDENT IN AN ORGANIZED HEALTH CARE EDUCATION/TRAINING PROGRAM

## (undated) DEVICE — MARKER SKIN RULER STERILE

## (undated) DEVICE — DRAPE STERI INSTRUMENT 1018

## (undated) DEVICE — GOWN ECLIPSE REINF L4 XLNG XXL

## (undated) DEVICE — SPONGE PATTY SURGICAL .5X3IN

## (undated) DEVICE — GLOVE BIOGEL ECLIPSE SZ 8

## (undated) DEVICE — BLADE SURGICAL 15C

## (undated) DEVICE — TRACKER ENT INSTRUMENT

## (undated) DEVICE — BOVIE SUCTION

## (undated) DEVICE — SYR B-D DISP CONTROL 10CC100/C

## (undated) DEVICE — CONTAINER SPECIMEN OR STER 4OZ

## (undated) DEVICE — SHEATH CLN ENDOSCRB 4MM

## (undated) DEVICE — SUT 4-0 CHROMIC GUT / P-3

## (undated) DEVICE — TRACKER PATIENT NON INVASIVE

## (undated) DEVICE — DRAPE INSTR MAGNETIC 10X16IN

## (undated) DEVICE — Device

## (undated) DEVICE — TUBING SUC UNIV W/CONN 12FT

## (undated) DEVICE — APPLICATOR STRL COT 2INNR 6IN

## (undated) DEVICE — BLADE TRICUT

## (undated) DEVICE — SPLINT NASAL AIRWAY SEPTAL SIL

## (undated) DEVICE — SOL NACL .9P 500ML

## (undated) DEVICE — SOL NORMAL USPCA 0.9%

## (undated) DEVICE — DRESSING TRANS 2X2 TEGADERM

## (undated) DEVICE — ELECTRODE REM PLYHSV RETURN 9

## (undated) DEVICE — SYR ONLY LUER LOCK 20CC

## (undated) DEVICE — SHEATH ENDO SCRUB 2 4MM 30DEG

## (undated) DEVICE — SUT PLN GUT 4-0 SC-1SC-1 1

## (undated) DEVICE — DRAPE STERI-DRAPE 1000 17X11IN

## (undated) DEVICE — SUT PROLENE 4-0 MONO 18IN